# Patient Record
Sex: FEMALE
[De-identification: names, ages, dates, MRNs, and addresses within clinical notes are randomized per-mention and may not be internally consistent; named-entity substitution may affect disease eponyms.]

---

## 2020-08-01 ENCOUNTER — NURSE TRIAGE (OUTPATIENT)
Dept: OTHER | Facility: CLINIC | Age: 35
End: 2020-08-01

## 2020-08-01 NOTE — TELEPHONE ENCOUNTER
Reason for Disposition   MODERATE-SEVERE abdominal pain (e.g., interferes with normal activities, awakens from sleep)    Answer Assessment - Initial Assessment Questions  1. LOCATION: \"Where does it hurt? \"       Pressure in lower abdomen / pelvis. 2. RADIATION: \"Does the pain shoot anywhere else? \" (e.g., chest, back)     Denies. 3. ONSET: \"When did the pain begin? \" (Minutes, hours or days ago)     1 day ago. 4. ONSET: \"Gradual or sudden onset? \"      Gradual.   5. PATTERN: \"Does the pain come and go, or has it been constant since it started? \"       Comes and goes. 6. SEVERITY: \"How bad is the pain? \" \"What does it keep you from doing? \"  (e.g., Scale 1-10; mild, moderate, or severe)    - MILD (1-3): doesn't interfere with normal activities, abdomen soft and not tender to touch     - MODERATE (4-7): interferes with normal activities or awakens from sleep, tender to touch     - SEVERE (8-10): excruciating pain, doubled over, unable to do any normal activities      Describes as a \"pressure\". 7/10. Interferes with activities and sleep. 7. RECURRENT SYMPTOM: \"Have you ever had this type of abdominal pain before? \" If so, ask: \"When was the last time? \" and \"What happened that time? \"       Denies. 8. CAUSE: \"What do you think is causing the abdominal pain? Unsure. 9. RELIEVING/AGGRAVATING FACTORS: \"What makes it better or worse? \" (e.g., antacids, bowel movement, movement)      Sitting up makes it a little better. 10. OTHER SYMPTOMS: \"Has there been any vaginal bleeding, fever, vomiting, diarrhea, or urine problems? \"      Hands are swollen,  had a pain in middle of chest last night for a short time. 11. ABBEY: \"What date are you expecting to deliver? \"      09/26/2020.     Protocols used: PREGNANCY - ABDOMINAL PAIN GREATER THAN 20 WEEKS EGA-ADULT-

## 2023-12-07 ENCOUNTER — OFFICE VISIT (OUTPATIENT)
Dept: UROLOGY | Facility: CLINIC | Age: 38
End: 2023-12-07
Payer: COMMERCIAL

## 2023-12-07 ENCOUNTER — PREP FOR PROCEDURE (OUTPATIENT)
Dept: UROLOGY | Facility: CLINIC | Age: 38
End: 2023-12-07

## 2023-12-07 VITALS
BODY MASS INDEX: 21.95 KG/M2 | SYSTOLIC BLOOD PRESSURE: 126 MMHG | HEART RATE: 87 BPM | TEMPERATURE: 97.7 F | WEIGHT: 136 LBS | DIASTOLIC BLOOD PRESSURE: 77 MMHG

## 2023-12-07 DIAGNOSIS — N20.0 KIDNEY STONE: Primary | ICD-10-CM

## 2023-12-07 DIAGNOSIS — N20.0 NEPHROLITHIASIS: ICD-10-CM

## 2023-12-07 LAB
POC APPEARANCE, URINE: CLEAR
POC BILIRUBIN, URINE: NEGATIVE
POC BLOOD, URINE: ABNORMAL
POC COLOR, URINE: YELLOW
POC GLUCOSE, URINE: NEGATIVE MG/DL
POC KETONES, URINE: NEGATIVE MG/DL
POC LEUKOCYTES, URINE: NEGATIVE
POC NITRITE,URINE: NEGATIVE
POC PH, URINE: 7 PH
POC PROTEIN, URINE: NEGATIVE MG/DL
POC SPECIFIC GRAVITY, URINE: 1.02
POC UROBILINOGEN, URINE: 0.2 EU/DL

## 2023-12-07 PROCEDURE — 99204 OFFICE O/P NEW MOD 45 MIN: CPT | Performed by: UROLOGY

## 2023-12-07 PROCEDURE — 87086 URINE CULTURE/COLONY COUNT: CPT

## 2023-12-07 PROCEDURE — 1036F TOBACCO NON-USER: CPT | Performed by: UROLOGY

## 2023-12-07 PROCEDURE — 81003 URINALYSIS AUTO W/O SCOPE: CPT | Performed by: UROLOGY

## 2023-12-07 RX ORDER — CEPHALEXIN 500 MG/1
500 CAPSULE ORAL 3 TIMES DAILY
COMMUNITY
Start: 2023-12-05 | End: 2023-12-12

## 2023-12-07 RX ORDER — NAPROXEN 500 MG/1
1 TABLET ORAL 2 TIMES DAILY PRN
COMMUNITY
Start: 2023-12-05

## 2023-12-07 NOTE — H&P (VIEW-ONLY)
PCP  Lotus Alvarenga MD         CHIEF COMPLAINT:  kidney stones         HISTORY OF PRESENT ILLNESS:  This is a 37 y.o. female,  who presents with kidney stones. Back pain started 10 days ago, she went to the ED on  and was noted to have a 1 cm calculus at the left UVJ. Started on cephalexin on 3 days ago. Does reports nausea and persistent pain in the L lower back and suprapubic pain.        Gyn History:  -  -  x2  - OCPs    OB History    No obstetric history on file.          No past medical history on file.    No past surgical history on file.    No family history on file.    Review of Systems   All other systems reviewed and are negative.            PHYSICAL EXAMINATION:  No LMP recorded.  Body mass index is 21.95 kg/m².  Visit Vitals  /77   Pulse 87   Temp 36.5 °C (97.7 °F)   Wt 61.7 kg (136 lb)   BMI 21.95 kg/m²   Smoking Status Never   BSA 1.69 m²     General Appearance: well appearing  Neuro: Alert and oriented   HEENT: normocephalic, atraumatic  Heart: warm and well perfused  Lungs: breathing comfortably on room air  Abdomen: non-distended  : left CVA tenderness  Extremities: moving all extremities  Neuro: awake and conversant  Psych: appropriate mood and affect    Urine dip:   Recent Results (from the past 6 hour(s))   POCT UA Automated manually resulted    Collection Time: 23  3:59 PM   Result Value Ref Range    POC Color, Urine Yellow Straw, Yellow, Light-Yellow    POC Appearance, Urine Clear Clear    POC Glucose, Urine NEGATIVE NEGATIVE mg/dl    POC Bilirubin, Urine NEGATIVE NEGATIVE    POC Ketones, Urine NEGATIVE NEGATIVE mg/dl    POC Specific Gravity, Urine 1.025 1.005 - 1.035    POC Blood, Urine SMALL (1+) (A) NEGATIVE    POC PH, Urine 7.0 No Reference Range Established PH    POC Protein, Urine NEGATIVE NEGATIVE, 30 (1+) mg/dl    POC Urobilinogen, Urine 0.2 0.2, 1.0 EU/DL    Poc Nitrite, Urine NEGATIVE NEGATIVE    POC Leukocytes, Urine NEGATIVE NEGATIVE          IMPRESSION AND PLAN:  Rika Steve is a 37 y.o.  who presents with 1 cm calculus in the L UVJ    Left nephrolithiasis  - 1 cm calculus noted in the left UVJ  - Discussed shock wave lithotripsy, offered scheduling for procedure tomorrow with Dr. Jenkins or to schedule procedure on Monday. Patient would like to proceed with procedure on Monday  - Risks of procedure discussed  - will schedule for L ureteroscopy, Left shock wave lithotripsy, and double J stent placement on     Seen and discussed with Dr. Evin Mace MD PGY-2  Obstetrics and Gynecology    Problem List Items Addressed This Visit    None  Visit Diagnoses       Kidney stone    -  Primary    Relevant Orders    POCT UA Automated manually resulted (Completed)    Post-Void Residual (Completed)           All questions and concerns were answered and addressed.  The patient expressed understanding and agrees with the plan.

## 2023-12-10 LAB — BACTERIA UR CULT: NO GROWTH

## 2023-12-10 NOTE — PROGRESS NOTES
I saw and evaluated the patient. I personally obtained the key and critical portions of the history and physical exam or was physically present for key and critical portions performed by the resident/fellow. I reviewed the resident/fellow's documentation and discussed the patient with the resident/fellow. I agree with the resident/fellow's medical decision making as documented in the note.   Niranjan Cleary MD

## 2023-12-11 ENCOUNTER — HOSPITAL ENCOUNTER (OUTPATIENT)
Facility: HOSPITAL | Age: 38
Setting detail: OUTPATIENT SURGERY
Discharge: HOME | End: 2023-12-11
Attending: UROLOGY | Admitting: UROLOGY
Payer: COMMERCIAL

## 2023-12-11 ENCOUNTER — APPOINTMENT (OUTPATIENT)
Dept: RADIOLOGY | Facility: HOSPITAL | Age: 38
End: 2023-12-11
Payer: COMMERCIAL

## 2023-12-11 ENCOUNTER — ANESTHESIA (OUTPATIENT)
Dept: OPERATING ROOM | Facility: HOSPITAL | Age: 38
End: 2023-12-11
Payer: COMMERCIAL

## 2023-12-11 ENCOUNTER — ANESTHESIA EVENT (OUTPATIENT)
Dept: OPERATING ROOM | Facility: HOSPITAL | Age: 38
End: 2023-12-11
Payer: COMMERCIAL

## 2023-12-11 VITALS
WEIGHT: 130 LBS | TEMPERATURE: 96.8 F | SYSTOLIC BLOOD PRESSURE: 110 MMHG | BODY MASS INDEX: 22.2 KG/M2 | OXYGEN SATURATION: 98 % | HEART RATE: 66 BPM | HEIGHT: 64 IN | DIASTOLIC BLOOD PRESSURE: 64 MMHG | RESPIRATION RATE: 16 BRPM

## 2023-12-11 DIAGNOSIS — G89.18 ACUTE POST-OPERATIVE PAIN: ICD-10-CM

## 2023-12-11 DIAGNOSIS — N20.0 NEPHROLITHIASIS: Primary | ICD-10-CM

## 2023-12-11 PROBLEM — N20.1 LEFT URETERAL STONE: Status: ACTIVE | Noted: 2023-12-11

## 2023-12-11 LAB — HCG UR QL IA.RAPID: NEGATIVE

## 2023-12-11 PROCEDURE — 2780000003 HC OR 278 NO HCPCS: Performed by: UROLOGY

## 2023-12-11 PROCEDURE — 2500000004 HC RX 250 GENERAL PHARMACY W/ HCPCS (ALT 636 FOR OP/ED): Performed by: NURSE ANESTHETIST, CERTIFIED REGISTERED

## 2023-12-11 PROCEDURE — 52300 CYSTOSCOPY AND TREATMENT: CPT | Performed by: UROLOGY

## 2023-12-11 PROCEDURE — C1769 GUIDE WIRE: HCPCS | Performed by: UROLOGY

## 2023-12-11 PROCEDURE — 2500000004 HC RX 250 GENERAL PHARMACY W/ HCPCS (ALT 636 FOR OP/ED)

## 2023-12-11 PROCEDURE — 76000 FLUOROSCOPY <1 HR PHYS/QHP: CPT | Mod: 59

## 2023-12-11 PROCEDURE — A52356 PR CYSTO/URETERO W/LITHOTRIPSY  AND INDWELL STENT INSRT: Performed by: NURSE ANESTHETIST, CERTIFIED REGISTERED

## 2023-12-11 PROCEDURE — 7100000001 HC RECOVERY ROOM TIME - INITIAL BASE CHARGE: Performed by: UROLOGY

## 2023-12-11 PROCEDURE — 52356 CYSTO/URETERO W/LITHOTRIPSY: CPT | Performed by: UROLOGY

## 2023-12-11 PROCEDURE — 7100000002 HC RECOVERY ROOM TIME - EACH INCREMENTAL 1 MINUTE: Performed by: UROLOGY

## 2023-12-11 PROCEDURE — 7100000009 HC PHASE TWO TIME - INITIAL BASE CHARGE: Performed by: UROLOGY

## 2023-12-11 PROCEDURE — A52356 PR CYSTO/URETERO W/LITHOTRIPSY  AND INDWELL STENT INSRT: Performed by: ANESTHESIOLOGY

## 2023-12-11 PROCEDURE — C1758 CATHETER, URETERAL: HCPCS | Performed by: UROLOGY

## 2023-12-11 PROCEDURE — 3600000003 HC OR TIME - INITIAL BASE CHARGE - PROCEDURE LEVEL THREE: Performed by: UROLOGY

## 2023-12-11 PROCEDURE — 3700000001 HC GENERAL ANESTHESIA TIME - INITIAL BASE CHARGE: Performed by: UROLOGY

## 2023-12-11 PROCEDURE — C2617 STENT, NON-COR, TEM W/O DEL: HCPCS | Performed by: UROLOGY

## 2023-12-11 PROCEDURE — 2500000004 HC RX 250 GENERAL PHARMACY W/ HCPCS (ALT 636 FOR OP/ED): Performed by: ANESTHESIOLOGY

## 2023-12-11 PROCEDURE — 2720000007 HC OR 272 NO HCPCS: Performed by: UROLOGY

## 2023-12-11 PROCEDURE — 3600000008 HC OR TIME - EACH INCREMENTAL 1 MINUTE - PROCEDURE LEVEL THREE: Performed by: UROLOGY

## 2023-12-11 PROCEDURE — 2500000001 HC RX 250 WO HCPCS SELF ADMINISTERED DRUGS (ALT 637 FOR MEDICARE OP): Performed by: ANESTHESIOLOGY

## 2023-12-11 PROCEDURE — 3700000002 HC GENERAL ANESTHESIA TIME - EACH INCREMENTAL 1 MINUTE: Performed by: UROLOGY

## 2023-12-11 PROCEDURE — 7100000010 HC PHASE TWO TIME - EACH INCREMENTAL 1 MINUTE: Performed by: UROLOGY

## 2023-12-11 PROCEDURE — 2500000005 HC RX 250 GENERAL PHARMACY W/O HCPCS: Performed by: NURSE ANESTHETIST, CERTIFIED REGISTERED

## 2023-12-11 PROCEDURE — 82365 CALCULUS SPECTROSCOPY: CPT | Performed by: UROLOGY

## 2023-12-11 PROCEDURE — 81025 URINE PREGNANCY TEST: CPT | Performed by: UROLOGY

## 2023-12-11 DEVICE — URETERAL STENT
Type: IMPLANTABLE DEVICE | Site: KIDNEY | Status: FUNCTIONAL
Brand: PERCUFLEX™ PLUS

## 2023-12-11 RX ORDER — OXYCODONE HYDROCHLORIDE 5 MG/1
5 TABLET ORAL EVERY 6 HOURS PRN
Qty: 5 TABLET | Refills: 0 | Status: SHIPPED | OUTPATIENT
Start: 2023-12-11

## 2023-12-11 RX ORDER — ONDANSETRON 4 MG/1
4 TABLET, ORALLY DISINTEGRATING ORAL EVERY 8 HOURS PRN
Status: CANCELLED | OUTPATIENT
Start: 2023-12-11

## 2023-12-11 RX ORDER — HYDROMORPHONE HYDROCHLORIDE 1 MG/ML
1 INJECTION, SOLUTION INTRAMUSCULAR; INTRAVENOUS; SUBCUTANEOUS EVERY 5 MIN PRN
Status: DISCONTINUED | OUTPATIENT
Start: 2023-12-11 | End: 2023-12-12 | Stop reason: HOSPADM

## 2023-12-11 RX ORDER — SODIUM CHLORIDE, SODIUM LACTATE, POTASSIUM CHLORIDE, CALCIUM CHLORIDE 600; 310; 30; 20 MG/100ML; MG/100ML; MG/100ML; MG/100ML
100 INJECTION, SOLUTION INTRAVENOUS CONTINUOUS
Status: DISCONTINUED | OUTPATIENT
Start: 2023-12-11 | End: 2023-12-12 | Stop reason: HOSPADM

## 2023-12-11 RX ORDER — MIDAZOLAM HYDROCHLORIDE 1 MG/ML
INJECTION, SOLUTION INTRAMUSCULAR; INTRAVENOUS AS NEEDED
Status: DISCONTINUED | OUTPATIENT
Start: 2023-12-11 | End: 2023-12-11

## 2023-12-11 RX ORDER — PROCHLORPERAZINE 25 MG/1
25 SUPPOSITORY RECTAL EVERY 12 HOURS PRN
Status: CANCELLED | OUTPATIENT
Start: 2023-12-11

## 2023-12-11 RX ORDER — POLYETHYLENE GLYCOL 3350 17 G/17G
17 POWDER, FOR SOLUTION ORAL DAILY
Qty: 10 PACKET | Refills: 0 | Status: SHIPPED | OUTPATIENT
Start: 2023-12-11 | End: 2023-12-21

## 2023-12-11 RX ORDER — OXYCODONE HYDROCHLORIDE 10 MG/1
10 TABLET ORAL EVERY 4 HOURS PRN
Status: DISCONTINUED | OUTPATIENT
Start: 2023-12-11 | End: 2023-12-12 | Stop reason: HOSPADM

## 2023-12-11 RX ORDER — ONDANSETRON HYDROCHLORIDE 2 MG/ML
4 INJECTION, SOLUTION INTRAVENOUS EVERY 8 HOURS PRN
Status: CANCELLED | OUTPATIENT
Start: 2023-12-11

## 2023-12-11 RX ORDER — MIDAZOLAM HYDROCHLORIDE 1 MG/ML
1 INJECTION, SOLUTION INTRAMUSCULAR; INTRAVENOUS ONCE AS NEEDED
Status: DISCONTINUED | OUTPATIENT
Start: 2023-12-11 | End: 2023-12-12 | Stop reason: HOSPADM

## 2023-12-11 RX ORDER — SCOLOPAMINE TRANSDERMAL SYSTEM 1 MG/1
1 PATCH, EXTENDED RELEASE TRANSDERMAL ONCE
Status: DISCONTINUED | OUTPATIENT
Start: 2023-12-11 | End: 2023-12-12 | Stop reason: HOSPADM

## 2023-12-11 RX ORDER — ONDANSETRON HYDROCHLORIDE 2 MG/ML
4 INJECTION, SOLUTION INTRAVENOUS ONCE AS NEEDED
Status: COMPLETED | OUTPATIENT
Start: 2023-12-11 | End: 2023-12-11

## 2023-12-11 RX ORDER — ONDANSETRON HYDROCHLORIDE 2 MG/ML
INJECTION, SOLUTION INTRAVENOUS AS NEEDED
Status: DISCONTINUED | OUTPATIENT
Start: 2023-12-11 | End: 2023-12-11

## 2023-12-11 RX ORDER — SENNOSIDES 8.6 MG/1
2 TABLET ORAL 2 TIMES DAILY
Status: CANCELLED | OUTPATIENT
Start: 2023-12-11

## 2023-12-11 RX ORDER — SODIUM CHLORIDE, SODIUM LACTATE, POTASSIUM CHLORIDE, CALCIUM CHLORIDE 600; 310; 30; 20 MG/100ML; MG/100ML; MG/100ML; MG/100ML
50 INJECTION, SOLUTION INTRAVENOUS CONTINUOUS
Status: DISCONTINUED | OUTPATIENT
Start: 2023-12-11 | End: 2023-12-12 | Stop reason: HOSPADM

## 2023-12-11 RX ORDER — PROPOFOL 10 MG/ML
INJECTION, EMULSION INTRAVENOUS AS NEEDED
Status: DISCONTINUED | OUTPATIENT
Start: 2023-12-11 | End: 2023-12-11

## 2023-12-11 RX ORDER — SODIUM CHLORIDE, SODIUM LACTATE, POTASSIUM CHLORIDE, CALCIUM CHLORIDE 600; 310; 30; 20 MG/100ML; MG/100ML; MG/100ML; MG/100ML
100 INJECTION, SOLUTION INTRAVENOUS CONTINUOUS
Status: DISCONTINUED | OUTPATIENT
Start: 2023-12-11 | End: 2023-12-11

## 2023-12-11 RX ORDER — PROCHLORPERAZINE MALEATE 10 MG
10 TABLET ORAL EVERY 6 HOURS PRN
Status: CANCELLED | OUTPATIENT
Start: 2023-12-11

## 2023-12-11 RX ORDER — IBUPROFEN 600 MG/1
600 TABLET ORAL EVERY 8 HOURS PRN
Status: CANCELLED | OUTPATIENT
Start: 2023-12-11

## 2023-12-11 RX ORDER — ACETAMINOPHEN 325 MG/1
650 TABLET ORAL EVERY 6 HOURS PRN
Qty: 14 TABLET | Refills: 0 | Status: SHIPPED | OUTPATIENT
Start: 2023-12-11 | End: 2023-12-25

## 2023-12-11 RX ORDER — ACETAMINOPHEN 325 MG/1
650 TABLET ORAL EVERY 6 HOURS
Status: CANCELLED | OUTPATIENT
Start: 2023-12-11

## 2023-12-11 RX ORDER — TAMSULOSIN HYDROCHLORIDE 0.4 MG/1
0.4 CAPSULE ORAL DAILY
Status: CANCELLED | OUTPATIENT
Start: 2023-12-12

## 2023-12-11 RX ORDER — PHENAZOPYRIDINE HYDROCHLORIDE 200 MG/1
200 TABLET, FILM COATED ORAL 3 TIMES DAILY PRN
Qty: 10 TABLET | Refills: 0 | Status: SHIPPED | OUTPATIENT
Start: 2023-12-11 | End: 2023-12-14

## 2023-12-11 RX ORDER — HYDROMORPHONE HYDROCHLORIDE 1 MG/ML
0.1 INJECTION, SOLUTION INTRAMUSCULAR; INTRAVENOUS; SUBCUTANEOUS EVERY 5 MIN PRN
Status: DISCONTINUED | OUTPATIENT
Start: 2023-12-11 | End: 2023-12-12 | Stop reason: HOSPADM

## 2023-12-11 RX ORDER — ALBUTEROL SULFATE 0.83 MG/ML
2.5 SOLUTION RESPIRATORY (INHALATION) ONCE AS NEEDED
Status: DISCONTINUED | OUTPATIENT
Start: 2023-12-11 | End: 2023-12-12 | Stop reason: HOSPADM

## 2023-12-11 RX ORDER — HYDRALAZINE HYDROCHLORIDE 20 MG/ML
5 INJECTION INTRAMUSCULAR; INTRAVENOUS EVERY 30 MIN PRN
Status: DISCONTINUED | OUTPATIENT
Start: 2023-12-11 | End: 2023-12-12 | Stop reason: HOSPADM

## 2023-12-11 RX ORDER — OXYCODONE HYDROCHLORIDE 5 MG/1
5 TABLET ORAL EVERY 6 HOURS PRN
Status: CANCELLED | OUTPATIENT
Start: 2023-12-11

## 2023-12-11 RX ORDER — PHENAZOPYRIDINE HYDROCHLORIDE 100 MG/1
200 TABLET, FILM COATED ORAL ONCE AS NEEDED
Status: DISCONTINUED | OUTPATIENT
Start: 2023-12-11 | End: 2023-12-12 | Stop reason: HOSPADM

## 2023-12-11 RX ORDER — LIDOCAINE HYDROCHLORIDE 20 MG/ML
INJECTION, SOLUTION EPIDURAL; INFILTRATION; INTRACAUDAL; PERINEURAL AS NEEDED
Status: DISCONTINUED | OUTPATIENT
Start: 2023-12-11 | End: 2023-12-11

## 2023-12-11 RX ORDER — ACETAMINOPHEN 325 MG/1
650 TABLET ORAL EVERY 4 HOURS PRN
Status: DISCONTINUED | OUTPATIENT
Start: 2023-12-11 | End: 2023-12-12 | Stop reason: HOSPADM

## 2023-12-11 RX ORDER — HYDROCODONE BITARTRATE AND ACETAMINOPHEN 5; 325 MG/1; MG/1
1 TABLET ORAL EVERY 4 HOURS PRN
Status: DISCONTINUED | OUTPATIENT
Start: 2023-12-11 | End: 2023-12-12 | Stop reason: HOSPADM

## 2023-12-11 RX ORDER — DEXAMETHASONE SODIUM PHOSPHATE 100 MG/10ML
INJECTION INTRAMUSCULAR; INTRAVENOUS AS NEEDED
Status: DISCONTINUED | OUTPATIENT
Start: 2023-12-11 | End: 2023-12-11

## 2023-12-11 RX ORDER — POLYETHYLENE GLYCOL 3350 17 G/17G
17 POWDER, FOR SOLUTION ORAL DAILY
Status: CANCELLED | OUTPATIENT
Start: 2023-12-12

## 2023-12-11 RX ORDER — HYDROMORPHONE HYDROCHLORIDE 1 MG/ML
0.5 INJECTION, SOLUTION INTRAMUSCULAR; INTRAVENOUS; SUBCUTANEOUS EVERY 5 MIN PRN
Status: DISCONTINUED | OUTPATIENT
Start: 2023-12-11 | End: 2023-12-12 | Stop reason: HOSPADM

## 2023-12-11 RX ORDER — FENTANYL CITRATE 50 UG/ML
INJECTION, SOLUTION INTRAMUSCULAR; INTRAVENOUS AS NEEDED
Status: DISCONTINUED | OUTPATIENT
Start: 2023-12-11 | End: 2023-12-11

## 2023-12-11 RX ORDER — PROCHLORPERAZINE EDISYLATE 5 MG/ML
10 INJECTION INTRAMUSCULAR; INTRAVENOUS EVERY 6 HOURS PRN
Status: CANCELLED | OUTPATIENT
Start: 2023-12-11

## 2023-12-11 RX ORDER — CEFAZOLIN SODIUM 2 G/100ML
2 INJECTION, SOLUTION INTRAVENOUS ONCE
Status: COMPLETED | OUTPATIENT
Start: 2023-12-11 | End: 2023-12-11

## 2023-12-11 RX ORDER — KETOROLAC TROMETHAMINE 30 MG/ML
30 INJECTION, SOLUTION INTRAMUSCULAR; INTRAVENOUS ONCE
Status: COMPLETED | OUTPATIENT
Start: 2023-12-11 | End: 2023-12-11

## 2023-12-11 RX ORDER — PHENAZOPYRIDINE HYDROCHLORIDE 100 MG/1
200 TABLET, FILM COATED ORAL 3 TIMES DAILY PRN
Status: CANCELLED | OUTPATIENT
Start: 2023-12-11

## 2023-12-11 RX ORDER — TAMSULOSIN HYDROCHLORIDE 0.4 MG/1
0.4 CAPSULE ORAL
Qty: 14 CAPSULE | Refills: 0 | Status: SHIPPED | OUTPATIENT
Start: 2023-12-11 | End: 2023-12-25

## 2023-12-11 RX ADMIN — ONDANSETRON 4 MG: 2 INJECTION INTRAMUSCULAR; INTRAVENOUS at 17:14

## 2023-12-11 RX ADMIN — SCOPALAMINE 1 PATCH: 1 PATCH, EXTENDED RELEASE TRANSDERMAL at 21:01

## 2023-12-11 RX ADMIN — PROMETHAZINE HYDROCHLORIDE 6.25 MG: 25 INJECTION INTRAMUSCULAR; INTRAVENOUS at 22:11

## 2023-12-11 RX ADMIN — FENTANYL CITRATE 25 MCG: 50 INJECTION, SOLUTION INTRAMUSCULAR; INTRAVENOUS at 16:10

## 2023-12-11 RX ADMIN — LIDOCAINE HYDROCHLORIDE 60 MG: 20 INJECTION, SOLUTION EPIDURAL; INFILTRATION; INTRACAUDAL; PERINEURAL at 15:17

## 2023-12-11 RX ADMIN — FENTANYL CITRATE 25 MCG: 50 INJECTION, SOLUTION INTRAMUSCULAR; INTRAVENOUS at 15:44

## 2023-12-11 RX ADMIN — HYDROCODONE BITARTRATE AND ACETAMINOPHEN 1 TABLET: 5; 325 TABLET ORAL at 19:24

## 2023-12-11 RX ADMIN — HYDROMORPHONE HYDROCHLORIDE 0.5 MG: 1 INJECTION, SOLUTION INTRAMUSCULAR; INTRAVENOUS; SUBCUTANEOUS at 17:18

## 2023-12-11 RX ADMIN — SODIUM CHLORIDE, SODIUM LACTATE, POTASSIUM CHLORIDE, AND CALCIUM CHLORIDE: 600; 310; 30; 20 INJECTION, SOLUTION INTRAVENOUS at 15:08

## 2023-12-11 RX ADMIN — HYDROMORPHONE HYDROCHLORIDE 0.5 MG: 1 INJECTION, SOLUTION INTRAMUSCULAR; INTRAVENOUS; SUBCUTANEOUS at 17:10

## 2023-12-11 RX ADMIN — ACETAMINOPHEN 650 MG: 325 TABLET ORAL at 22:45

## 2023-12-11 RX ADMIN — KETOROLAC TROMETHAMINE 30 MG: 30 INJECTION, SOLUTION INTRAMUSCULAR at 18:43

## 2023-12-11 RX ADMIN — PHENAZOPYRIDINE HYDROCHLORIDE 200 MG: 100 TABLET ORAL at 18:30

## 2023-12-11 RX ADMIN — SODIUM CHLORIDE, POTASSIUM CHLORIDE, SODIUM LACTATE AND CALCIUM CHLORIDE 100 ML/HR: 600; 310; 30; 20 INJECTION, SOLUTION INTRAVENOUS at 14:07

## 2023-12-11 RX ADMIN — PROPOFOL 200 MG: 10 INJECTION, EMULSION INTRAVENOUS at 15:17

## 2023-12-11 RX ADMIN — SODIUM CHLORIDE, SODIUM LACTATE, POTASSIUM CHLORIDE, AND CALCIUM CHLORIDE: 600; 310; 30; 20 INJECTION, SOLUTION INTRAVENOUS at 16:36

## 2023-12-11 RX ADMIN — SODIUM CHLORIDE, POTASSIUM CHLORIDE, SODIUM LACTATE AND CALCIUM CHLORIDE 100 ML/HR: 600; 310; 30; 20 INJECTION, SOLUTION INTRAVENOUS at 19:03

## 2023-12-11 RX ADMIN — HYDROMORPHONE HYDROCHLORIDE 0.5 MG: 1 INJECTION, SOLUTION INTRAMUSCULAR; INTRAVENOUS; SUBCUTANEOUS at 17:50

## 2023-12-11 RX ADMIN — MIDAZOLAM 2 MG: 1 INJECTION INTRAMUSCULAR; INTRAVENOUS at 15:08

## 2023-12-11 RX ADMIN — DEXAMETHASONE SODIUM PHOSPHATE 4 MG: 10 INJECTION INTRAMUSCULAR; INTRAVENOUS at 15:17

## 2023-12-11 RX ADMIN — CEFAZOLIN SODIUM 1 G: 2 INJECTION, SOLUTION INTRAVENOUS at 15:23

## 2023-12-11 RX ADMIN — HYDROMORPHONE HYDROCHLORIDE 0.5 MG: 1 INJECTION, SOLUTION INTRAMUSCULAR; INTRAVENOUS; SUBCUTANEOUS at 18:05

## 2023-12-11 RX ADMIN — ONDANSETRON 4 MG: 2 INJECTION, SOLUTION INTRAMUSCULAR; INTRAVENOUS at 15:17

## 2023-12-11 RX ADMIN — FENTANYL CITRATE 50 MCG: 50 INJECTION, SOLUTION INTRAMUSCULAR; INTRAVENOUS at 15:17

## 2023-12-11 SDOH — HEALTH STABILITY: MENTAL HEALTH: CURRENT SMOKER: 0

## 2023-12-11 ASSESSMENT — PAIN SCALES - GENERAL
PAINLEVEL_OUTOF10: 8
PAINLEVEL_OUTOF10: 7
PAINLEVEL_OUTOF10: 6
PAINLEVEL_OUTOF10: 5 - MODERATE PAIN
PAINLEVEL_OUTOF10: 8
PAINLEVEL_OUTOF10: 6
PAINLEVEL_OUTOF10: 0 - NO PAIN
PAINLEVEL_OUTOF10: 0 - NO PAIN
PAINLEVEL_OUTOF10: 6
PAINLEVEL_OUTOF10: 7
PAINLEVEL_OUTOF10: 0 - NO PAIN
PAINLEVEL_OUTOF10: 7
PAINLEVEL_OUTOF10: 0 - NO PAIN
PAINLEVEL_OUTOF10: 6
PAINLEVEL_OUTOF10: 6
PAIN_LEVEL: 1
PAINLEVEL_OUTOF10: 6
PAINLEVEL_OUTOF10: 8

## 2023-12-11 ASSESSMENT — PAIN - FUNCTIONAL ASSESSMENT
PAIN_FUNCTIONAL_ASSESSMENT: 0-10

## 2023-12-11 ASSESSMENT — COLUMBIA-SUICIDE SEVERITY RATING SCALE - C-SSRS
6. HAVE YOU EVER DONE ANYTHING, STARTED TO DO ANYTHING, OR PREPARED TO DO ANYTHING TO END YOUR LIFE?: NO
2. HAVE YOU ACTUALLY HAD ANY THOUGHTS OF KILLING YOURSELF?: NO
1. IN THE PAST MONTH, HAVE YOU WISHED YOU WERE DEAD OR WISHED YOU COULD GO TO SLEEP AND NOT WAKE UP?: NO

## 2023-12-11 ASSESSMENT — PAIN DESCRIPTION - DESCRIPTORS
DESCRIPTORS: SHARP
DESCRIPTORS: SHARP

## 2023-12-11 NOTE — ANESTHESIA PROCEDURE NOTES
Airway  Date/Time: 12/11/2023 3:19 PM  Urgency: elective    Airway not difficult    Staffing  Performed: SHAYNE   Authorized by: Dick Arechiga MD    Performed by: SHAYNE Cox  Patient location during procedure: OR    Indications and Patient Condition  Indications for airway management: anesthesia  Spontaneous Ventilation: absent  Sedation level: deep  Preoxygenated: yes  Patient position: sniffing  MILS maintained throughout  Mask difficulty assessment: 1 - vent by mask  Planned trial extubation    Final Airway Details  Final airway type: supraglottic airway      Successful airway: classic  Size 3     Number of attempts at approach: 1  Ventilation between attempts: supraglottic airway

## 2023-12-11 NOTE — ANESTHESIA PREPROCEDURE EVALUATION
Patient: Rika Steve    Procedure Information       Date/Time: 12/11/23 1335    Procedure: Cystoscopy, retrograde pyelogram, Left Ureteroscopy, Laser lithotripsy,  left ureteral stent placement (Left)    Location: [facility] / [facility]    Surgeons: Niranjan Cleary MD            Relevant Problems   /Renal   (+) Nephrolithiasis       Clinical information reviewed:   Tobacco  Allergies  Meds   Med Hx  Surg Hx  OB Status  Fam Hx  Soc   Hx        NPO Detail:  NPO/Void Status  Carbonhydrate Drink Given Prior to Surgery? : N  Date of Last Liquid: 12/10/23  Time of Last Liquid: 2000  Date of Last Solid: 12/10/23  Time of Last Solid: 2000  Last Intake Type: Light meal  Time of Last Void: 1245         Physical Exam    Airway  Mallampati: II  TM distance: >3 FB  Neck ROM: full     Cardiovascular - normal exam  Rhythm: regular  Rate: normal     Dental - normal exam     Pulmonary - normal exam  Breath sounds clear to auscultation     Abdominal   Abdomen: soft  Bowel sounds: normal           Anesthesia Plan    ASA 2     general     The patient is not a current smoker.  Patient was previously instructed to abstain from smoking on day of procedure.  Patient did not smoke on day of procedure.  Education provided regarding risk of obstructive sleep apnea.  intravenous induction   Postoperative administration of opioids is intended.  Anesthetic plan and risks discussed with patient.  Use of blood products discussed with patient who.    Plan discussed with [facility].

## 2023-12-11 NOTE — DISCHARGE INSTRUCTIONS
DEPARTMENT OF Urology  DISCHARGE INSTRUCTIONS Ureteroscopy Laser Lithotripsy  Outpatient Surgery    C O N F I D E N T I A L   I N F O R M A T I O N    Rika Steve    Call [phone] during regular daytime business hours (8:00 am - 5:00 pm) and after 5:00 pm ask for the Urology resident with any questions or concerns.    If it is a life-threatening situation, proceed to the nearest emergency department.              Thank you for the opportunity to care for you today.  Your health and healing are very important to us.  We hope we made you feel as comfortable as possible and are committed to your recovery and continued well-being.      The following is a brief overview of your Ureteroscopy Laser Lithotripsy procedure today. Some of the information contained on this summary may be confidential.  This information should be kept in your records and should be shared with your regular doctor.    Physicians:   Dr. Cleary    Procedure performed: Removal of your kidney stone,    What to Expect During your Recovery and Home Care  Anesthesia Side Effects   You received anesthesia today.  You may feel sleepy, tired, or have a sore throat.   You may also feel drowsiness, dizziness, or inability to think clearly.  For your safety, do not drive, drink alcoholic beverages, take any unprescribed medication or make any important decisions for 24 hours.  A responsible adult should be with you for 24 hours.        Activity and Recovery    No heavy lifting today. Rest for the next 24 hours.    Pain Control  Unfortunately, you may experience pain after your procedure.  Frequency and urgency to urinate and mild discomfort are expected. Adequate pain management can include alternative measures to help ease your pain and that can include over the counter Tylenol/ibuprofen and a heating pad or Oxycodone which can be taken as prescribed as needed for breakthrough pain. Do not take more than 4,000mg of Tylenol in a 24-hour period.       You may have also been prescribed Pyridium (for burning sensation) and Ditropan(for bladder spasms) for pain control. Pyridium will turn your urine bright orange.    Nausea/Vomiting   Clear liquids are best tolerated at first. Start slow, advance your diet as tolerated to normal foods. Avoid spicy, greasy, heavy foods at first. Also, you may feel nauseous or like you need to vomit if you take any type of medication on an empty stomach.  Call your physician if you are unable to eat or drink and have persistent vomiting.    Signs of Bleeding   You could have some blood in your urine off and on over the next several weeks. Your urine will be light pink to yellow.    Treatment/wound care:   It is okay to shower 24 hours after time of surgery.    Signs of Infection  Signs of infection can include fever, chills, burning sensation with passing of urine, or severe abdominal pain.  If you see any of these occur, please contact your doctor's office at [phone].  Any fever higher than 100.4, especially if associated with an ill feeling, abdominal pain, chills, or nausea should be reported to your surgeon.      Assist in bowel movements/urination  Increase fiber in diet  Increase water (6 to 8 glasses)  Increase walking   Urination should occur within 6 hours of anesthesia  If you have tried these methods and your bladder still feels full and you cannot use the bathroom, please go to your nearest Emergency room/contact your physician.    Additional Instructions:   Pieces of your kidney stone may pass in the urine for a few days and may cause some mild pain. You also had a stent placed today from your kidney down into your bladder. This helps keep the urinary tract open and prevents blockages of urine flow. The stent can be irritating and can cause some frequency, urgency and blood in your urine when you go to the bathroom. It is important to drink plenty of water and take medications as prescribed. You may be sent home  with Pyridium which turns your urine bright orange. Applying a heating pad to your back will also help with this kind of pain.     Your stent was left on a string. You may remove the stent yourself by in 3 days by pulling on the string until the stent is completely out. You may also call the office to schedule stent removal in the office if you would like.

## 2023-12-13 ENCOUNTER — OFFICE VISIT (OUTPATIENT)
Dept: UROLOGY | Facility: CLINIC | Age: 38
End: 2023-12-13
Payer: COMMERCIAL

## 2023-12-13 VITALS — TEMPERATURE: 97.7 F | HEART RATE: 87 BPM | SYSTOLIC BLOOD PRESSURE: 114 MMHG | DIASTOLIC BLOOD PRESSURE: 72 MMHG

## 2023-12-13 DIAGNOSIS — R35.0 FREQUENCY OF URINATION: Primary | ICD-10-CM

## 2023-12-13 DIAGNOSIS — N13.2 HYDRONEPHROSIS WITH URINARY OBSTRUCTION DUE TO RENAL CALCULUS: ICD-10-CM

## 2023-12-13 LAB
POC APPEARANCE, URINE: CLEAR
POC BILIRUBIN, URINE: NEGATIVE
POC BLOOD, URINE: ABNORMAL
POC COLOR, URINE: ABNORMAL
POC GLUCOSE, URINE: ABNORMAL MG/DL
POC KETONES, URINE: ABNORMAL MG/DL
POC LEUKOCYTES, URINE: ABNORMAL
POC NITRITE,URINE: POSITIVE
POC PH, URINE: 5.5 PH
POC PROTEIN, URINE: ABNORMAL MG/DL
POC SPECIFIC GRAVITY, URINE: 1.02
POC UROBILINOGEN, URINE: 2 EU/DL

## 2023-12-13 PROCEDURE — 81003 URINALYSIS AUTO W/O SCOPE: CPT | Performed by: UROLOGY

## 2023-12-13 PROCEDURE — 99213 OFFICE O/P EST LOW 20 MIN: CPT | Performed by: UROLOGY

## 2023-12-13 PROCEDURE — 1036F TOBACCO NON-USER: CPT | Performed by: UROLOGY

## 2023-12-13 ASSESSMENT — ENCOUNTER SYMPTOMS
ALLERGIC/IMMUNOLOGIC NEGATIVE: 1
GASTROINTESTINAL NEGATIVE: 1
RESPIRATORY NEGATIVE: 1
MUSCULOSKELETAL NEGATIVE: 1
NEUROLOGICAL NEGATIVE: 1
CARDIOVASCULAR NEGATIVE: 1
PSYCHIATRIC NEGATIVE: 1
HEMATOLOGIC/LYMPHATIC NEGATIVE: 1
ENDOCRINE NEGATIVE: 1
EYES NEGATIVE: 1
CONSTITUTIONAL NEGATIVE: 1

## 2023-12-13 NOTE — PROGRESS NOTES
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 37 y.o. female presents today for removal of left ureteral stent. She has had some stent related discomfort, but she is doing well otherwise. Patient has no known allergies.. Patient is a non-smoker.     Plan:  UA showed red urine with +glucose, trace ketones, +++blood, ++protein, 2.0 EU/DL urobilinogen, positive nitrite, and +++leukocytes.     I personally reviewed a KUB from 2019 through the [facility] EMR with the patient today in clinic.      I personally reviewed the CT abdomen pelvis dated 12/4/23 with the patient today in clinic. FINDINGS:   Included images of the lower thorax: No focal lung consolidation or pleural effusion.   Hepatobiliary: Unremarkable liver without biliary dilation evident.   Pancreas: Unremarkable   Spleen: Unremarkable   Adrenal Glands: Unremarkable   Kidneys, ureters, and bladder: There is a 1 cm calculus at the left UVJ. No hydronephrosis. Additional suspected punctate nonobstructing left nephrolithiasis.   Abdominal and pelvic vasculature: Unremarkable   GI tract: No evidence of obstruction. The appendix is not visualized.   Peritoneum and  retroperitoneum: No free fluid or free air is noted.   Lymph Nodes: No abdominal or pelvic lymphadenopathy is evident.   Uterus and adnexa: Not well evaluated by CT.   Visualized musculoskeletal structures: No acute fracture or destructive osseous lesion is identified.   IMPRESSION:   1. 1 cm calculus at the left UVJ. No hydroureteronephrosis.       I personally reviewed the renal US dated 12/4/23 with the patient today in clinic. FINDINGS:   Right kidney   Size: 9.4 cm.   Renal cortex: Normal.   Hydronephrosis: None.   Calculi, cysts or masses: None.   Left kidney   Size: 11.0 cm.   Renal cortex: Normal.   Hydronephrosis: None.   Calculi, cysts or masses:   There is at least one nonobstructing stone measuring 5 mm.   A caliectasis versus parapelvic cyst in the inferior pole now measures 9 mm previously 1.3 cm on 2020 ultrasound scan.   Urinary bladder   There is a 1.3 x 0.7 cm stone in the left UVJ region (image 45 out of 102), possibly in the left ureterocele seen on 2020 ultrasound scan. Weak jet/ flow in the vicinity of the stone is demonstrated (image 56 out of 102). Normal right UVJ jet is seen.   Pre void bladder volume: 259 mL.   Post void bladder volume: 18 mL.   IMPRESSION:   * A 1.3 x 0.7 cm stone in the left UVJ region without upstream hydroureteronephrosis to suggest urinary tract obstruction. Weak jet/ flow in the vicinity of the stone is demonstrated. According to the sonographer, the patient had severe left-sided pain during the scan.   *  Left nephrolithiasis.   *  Sonographically unremarkable right kidney.       I do not yet have access to her pathology report.     I would like her to continue on antibiotics, drink plenty of fluids (2L of water/day), and obtain an US of kidneys in 6 months. I asked her to follow up with myself to review the results of renal US or sooner as needed.     Chief Complaint  History Of Present Illness   Testing Results:  UA showed red urine with +glucose, trace ketones,  +++blood, ++protein, 2.0 EU/DL urobilinogen, positive nitrite, and +++leukocytes. (Menses)  Left nephrolithiasis s/p cystoscopy, retrograde pyelogram, Left Ureteroscopy, Laser lithotripsy, unroofing of left ureteroecele of upper pole moeity and left ureteral stent placement 12/11/23 with myself    Today's Visit:  Last seen by myself 12/11/23 for cystoscopy, retrograde pyelogram, Left Ureteroscopy, Laser lithotripsy, left ureteral stent placement. 37 y.o. female presents today for removal of left ureteral stent. She has had some stent related discomfort, but she is doing well otherwise. Patient has no known allergies.. Patient is a non-smoker.      Review of Systems  Review of Systems   Constitutional: Negative.    HENT: Negative.     Eyes: Negative.    Respiratory: Negative.     Cardiovascular: Negative.    Gastrointestinal: Negative.    Endocrine: Negative.    Genitourinary:         REFER TO HPI   Musculoskeletal: Negative.    Skin: Negative.    Allergic/Immunologic: Negative.    Neurological: Negative.    Hematological: Negative.    Psychiatric/Behavioral: Negative.          Active Problems   Patient Active Problem List   Diagnosis    Nephrolithiasis    Left ureteral stone        Medical History  She has a past medical history of Asthma and Nephrolithiasis.    Surgical History  She has a past surgical history that includes Septoplasty; Rhinoplasty; and Knee surgery (Right).     Family History  Family History   Problem Relation Name Age of Onset    Arthritis Father Drake Miller     Diabetes Maternal Grandfather Sarai Abreu     Cancer Brother Kp Miller        Social History  She reports that she has never smoked. She has never used smokeless tobacco. She reports that she does not currently use alcohol. She reports that she does not currently use drugs.     Allergies  Patient has no known allergies.    Current Meds  Current Outpatient Medications:     acetaminophen (TylenoL) 325 mg tablet, Take 2 tablets  (650 mg) by mouth every 6 hours if needed for mild pain (1 - 3) for up to 14 days., Disp: 14 tablet, Rfl: 0    naproxen (Naprosyn) 500 mg tablet, Take 1 tablet (500 mg) by mouth 2 times a day as needed., Disp: , Rfl:     oxyCODONE (Roxicodone) 5 mg immediate release tablet, Take 1 tablet (5 mg) by mouth every 6 hours if needed for severe pain (7 - 10) for up to 5 doses., Disp: 5 tablet, Rfl: 0    phenazopyridine (Pyridium) 200 mg tablet, Take 1 tablet (200 mg) by mouth 3 times a day as needed (bladder irritation) for up to 3 days., Disp: 10 tablet, Rfl: 0    polyethylene glycol (Miralax) 17 gram packet, Take 17 g by mouth once daily for 10 days., Disp: 10 packet, Rfl: 0    tamsulosin (Flomax) 0.4 mg 24 hr capsule, Take 1 capsule (0.4 mg) by mouth once daily in the morning. Take before meals for 14 days., Disp: 14 capsule, Rfl: 0    Vitals  Visit Vitals  /72   Pulse 87   Temp 36.5 °C (97.7 °F)   LMP 11/11/2023   OB Status Having periods   Smoking Status Never        Physical Exam  NP: Constitutional: alert and in no acute distress, well developed, well nourished.   Head and Face: head and face: unremarkable.   Neck: no neck asymmetry, supple.   Pulmonary: no respiratory distress, unremarkable respiratory effort.   Skin: normal skin color and pigmentation, normal skin turgor, and no rash.   Neurologic: cranial nerves: non-focal, grossly intact.   Psychiatric: alert and oriented x 3.     Sexual Maturity: Normal.   External Genitalia: Unremarkable.   Rectum: Unremarkable.   Anus: Unremarkable.   Perianal area: Unremarkable.     Left ureteral stent removed without complication, patient tolerated well.     Results/Data  Recent Results (from the past 12 hour(s))   POCT UA Automated manually resulted    Collection Time: 12/13/23 12:08 PM   Result Value Ref Range    POC Color, Urine Red (A) Straw, Yellow, Light-Yellow    POC Appearance, Urine Clear Clear    POC Glucose, Urine 100 (1+) (A) NEGATIVE mg/dl    POC  Bilirubin, Urine NEGATIVE NEGATIVE    POC Ketones, Urine TRACE (A) NEGATIVE mg/dl    POC Specific Gravity, Urine 1.020 1.005 - 1.035    POC Blood, Urine LARGE (3+) (A) NEGATIVE    POC PH, Urine 5.5 No Reference Range Established PH    POC Protein, Urine 100 (2+) (A) NEGATIVE, 30 (1+) mg/dl    POC Urobilinogen, Urine 2.0 (A) 0.2, 1.0 EU/DL    Poc Nitrite, Urine POSITIVE (A) NEGATIVE    POC Leukocytes, Urine LARGE (3+) (A) NEGATIVE     Signatures  Scribe Attestation  By signing my name below, I, Jose A Damico   attest that this documentation has been prepared under the direction and in the presence of Niranjan Cleary MD.

## 2023-12-15 LAB
APPEARANCE STONE: NORMAL
COMPN STONE: NORMAL
SPECIMEN WT: 434 MG

## 2024-01-23 DIAGNOSIS — Z20.828 EXPOSURE TO INFLUENZA: Primary | ICD-10-CM

## 2024-01-23 RX ORDER — OSELTAMIVIR PHOSPHATE 75 MG/1
CAPSULE ORAL
Qty: 5 CAPSULE | Refills: 0 | Status: SHIPPED | OUTPATIENT
Start: 2024-01-23

## 2024-02-09 ENCOUNTER — HOSPITAL ENCOUNTER (OUTPATIENT)
Dept: RADIOLOGY | Facility: CLINIC | Age: 39
Discharge: HOME | End: 2024-02-09
Payer: COMMERCIAL

## 2024-02-09 DIAGNOSIS — N13.2 HYDRONEPHROSIS WITH URINARY OBSTRUCTION DUE TO RENAL CALCULUS: ICD-10-CM

## 2024-02-09 PROCEDURE — 76770 US EXAM ABDO BACK WALL COMP: CPT

## 2024-02-09 PROCEDURE — 76770 US EXAM ABDO BACK WALL COMP: CPT | Performed by: RADIOLOGY

## 2024-02-15 ENCOUNTER — OFFICE VISIT (OUTPATIENT)
Dept: UROLOGY | Facility: CLINIC | Age: 39
End: 2024-02-15
Payer: COMMERCIAL

## 2024-02-15 VITALS
BODY MASS INDEX: 22.31 KG/M2 | WEIGHT: 130 LBS | SYSTOLIC BLOOD PRESSURE: 111 MMHG | TEMPERATURE: 97.7 F | DIASTOLIC BLOOD PRESSURE: 77 MMHG | HEART RATE: 93 BPM

## 2024-02-15 DIAGNOSIS — N20.0 KIDNEY STONE: Primary | ICD-10-CM

## 2024-02-15 DIAGNOSIS — M54.50 LOW BACK PAIN, UNSPECIFIED BACK PAIN LATERALITY, UNSPECIFIED CHRONICITY, UNSPECIFIED WHETHER SCIATICA PRESENT: ICD-10-CM

## 2024-02-15 PROCEDURE — 87086 URINE CULTURE/COLONY COUNT: CPT

## 2024-02-15 PROCEDURE — 1036F TOBACCO NON-USER: CPT | Performed by: UROLOGY

## 2024-02-15 PROCEDURE — 99214 OFFICE O/P EST MOD 30 MIN: CPT | Performed by: UROLOGY

## 2024-02-15 ASSESSMENT — ENCOUNTER SYMPTOMS
CONSTITUTIONAL NEGATIVE: 1
GASTROINTESTINAL NEGATIVE: 1
ALLERGIC/IMMUNOLOGIC NEGATIVE: 1
MUSCULOSKELETAL NEGATIVE: 1
NEUROLOGICAL NEGATIVE: 1
RESPIRATORY NEGATIVE: 1
ENDOCRINE NEGATIVE: 1
EYES NEGATIVE: 1
CARDIOVASCULAR NEGATIVE: 1
HEMATOLOGIC/LYMPHATIC NEGATIVE: 1
PSYCHIATRIC NEGATIVE: 1

## 2024-02-16 LAB — BACTERIA UR CULT: NO GROWTH

## 2024-02-22 ENCOUNTER — EVALUATION (OUTPATIENT)
Dept: PHYSICAL THERAPY | Facility: CLINIC | Age: 39
End: 2024-02-22
Payer: COMMERCIAL

## 2024-02-22 DIAGNOSIS — M54.50 LOW BACK PAIN: Primary | ICD-10-CM

## 2024-02-22 PROCEDURE — 97014 ELECTRIC STIMULATION THERAPY: CPT | Mod: GP

## 2024-02-22 PROCEDURE — 97161 PT EVAL LOW COMPLEX 20 MIN: CPT | Mod: GP

## 2024-02-22 ASSESSMENT — COLUMBIA-SUICIDE SEVERITY RATING SCALE - C-SSRS
1. IN THE PAST MONTH, HAVE YOU WISHED YOU WERE DEAD OR WISHED YOU COULD GO TO SLEEP AND NOT WAKE UP?: NO
2. HAVE YOU ACTUALLY HAD ANY THOUGHTS OF KILLING YOURSELF?: NO
6. HAVE YOU EVER DONE ANYTHING, STARTED TO DO ANYTHING, OR PREPARED TO DO ANYTHING TO END YOUR LIFE?: NO

## 2024-02-22 ASSESSMENT — ENCOUNTER SYMPTOMS
DEPRESSION: 0
OCCASIONAL FEELINGS OF UNSTEADINESS: 0
LOSS OF SENSATION IN FEET: 0

## 2024-02-22 NOTE — PROGRESS NOTES
Patient Name Rika Steve  MRN: [id number]  Today's Date: 24  Time Calculation  Start Time: 0200  Stop Time: 245  Time Calculation (min): 45 min    Therapy Diagnoses:   1. Low back pain  Follow Up In Physical Therapy        General:  Reason for visit: LBP with right upper buttock pain  Referred by: Dr Evin Cowart MD appt:  24  Preferred Name:  Rika  Script:  PT evaluate and treat  Onset Date:  2/15/2024    Insurance:   Visit #: 1  Insurance Reviewed  (per information provided by  pre-cert team)   Authorization required:  N  Approved # of visits: 40  Authorization/MCR certification date range:      Assessment:    Pt has had right LS and buttock pain since last November when she was struggling with a kidney stone. She presents currently with preference to standing and walking with sitting and bending making her symptoms worse. Standing lumbar extension does not worsen, nor improve symptoms. Prone lumbar extension causes increased pain. 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 Pt will benefit from starting conservatively with prone on pillows and progressive lumbar extension.    Problems:    Pain:  _x__  Posture/Body mechanics deficits:  __x_  Decreased knowledge HEP:  __x_  Decreased knowledge of Precautions:  _x__  Activity Limitations:   _x__  ADL's/IADL's/Self-care skills:  _x__  ROM/Joint Mobility:  __x_  Strength:  __x_  Decreased functional level:   __x_  Flexibility:  _x__  Tenderness/decreased soft tissue mobility:  _x__  Gait/Stairs:  ___  Fall Risk:  ___  Balance:  ___  Edema:  ___  Participation restrictions:  ___  Sensory:  ___  Transfers:  ___  Decreased knowledge of brace:   ___  Other:  ___    X Indicates included in problem list    Goals:      ST weeks  Increased postural awareness    Compliant with HEP.     LTG:  by discharge  Increased postural awareness and posture WFL.     pain to  0-2 and patient I with self-management of symptoms.     Decreased pain and increased function with ADL's and IADL's.  Improve Oswestry to: 10% limitation of function.    Normal gait on level and uneven surfaces community level distances for improved function in the community.    Increase trunk AROM to WFL for improved function with taking care of her young children.      Increase trunk strength and stability to WFL for improved function with daily tasks of homemaking    I and compliant with HEP and proper neck and back care.       Rehab potential to achieve the above goals is good.    Patient is aware of diagnosis and prognosis and agrees with established plan of care and goals.    Plan:   Skilled PT 2 x/week for 4+6 weeks (Until goals achieved, maximum rehab potential has been achieved, or patient has plateaued)  for:    Aquatics  _____  CP   __x__  Dry needling  ____  Education  __x__  Electrical Stimulation  __x__  Gait training  ____  HEP  __x__  HP  ____  Manual  _x___  Mechanical Traction  ____  NMR  _x___  Self-care/home management  _x___  Therapeutic Exercise   _x___  Therapeutic Activities  __x__    ____  Work Conditioning  ____  Re-assessment  __x__  Other  ____    X Indicates included in treatment plan    Subjective:    HPI: Pt had kidney stone surgery last November and had back pain prior to surgery and has had pain ever since    Pain:    Pain 6-7/10     Type of pain:  sharp, constant  What increases pain: sitting and bending  What decreases pain:  standing and walking    Medical Hx/ Asthma/Kidney stones  Precautions: n/a     Adult Screening Risk:      Fall Risk:  no    Patient goal:  pain reduction  Patient is aware of diagnosis and prognosis.    Living Environment:    Social Support:  lives with: spouse and 2 young children    Prior Function:   Independent with all function prior to having kidney stones    Function:    A and O x 3  Sleep:  instructed in proper postures. Suggesting prone lying  intermittently with pillows  ADL's: limited with bending   Chores: limited by pain  Driving: independent  Work: endures pain with taking care of her children  Recreation: unable to work out  Sitting: Worsening symptoms  Standing:  symptoms improve   Walking:  symptoms improve      Objective:        Outcome Measures:  Other Measures  Oswestry Disablity Index (DAMARIS): 28     Posture:  WFL    Palpation: Tender over right PSIS    ROM:  Trunk:  Flexion:  3rd to floor touching  Extension: Approx 50% in standing,  in prone 75% with increased pain    MMT:  Abd: WFL  Bridge/LE extensors: WFL  B LE myotomes: WFL    Flexibility:  Hamstrings:  SLR:  R: (+) SLR  L: 90 deg  Hip flexors: WFL    Treatment:    Evaluation:  25 minutes  Modalities: 15 minutes  IFC x 15 min, 10 CV to LSA region with prone on pillow and DCP  Brief ex program given for positioning techniques to relieve pain  Attempted right flexion rotation mobs but patient had increased pain  Education:  poc, anatomy, physiology, posture, body mechanics, safety awareness, HEP.  Preferred learning:  pictures, demonstration.  Verbalizes good understanding.

## 2024-02-26 ENCOUNTER — TREATMENT (OUTPATIENT)
Dept: PHYSICAL THERAPY | Facility: CLINIC | Age: 39
End: 2024-02-26
Payer: COMMERCIAL

## 2024-02-26 DIAGNOSIS — M54.50 LOW BACK PAIN: Primary | ICD-10-CM

## 2024-02-26 PROCEDURE — 97161 PT EVAL LOW COMPLEX 20 MIN: CPT | Mod: GP

## 2024-02-26 PROCEDURE — 97110 THERAPEUTIC EXERCISES: CPT | Mod: GP

## 2024-02-26 PROCEDURE — 97014 ELECTRIC STIMULATION THERAPY: CPT | Mod: GP

## 2024-02-26 NOTE — PROGRESS NOTES
Physical Therapy Progress Notes    Patient Name Rika Steve  MRN: [id number]  Today's Date: 02/26/24  Time Calculation  Start Time: 1645  Stop Time: 1745  Time Calculation (min): 60 min  Therapuetic Exercise - 40 min  Manual Therapy-5 min  Electric stim with CP -15 min    Therapy Diagnoses:   1. Low back pain  Follow Up In Physical Therapy    Follow Up In Physical Therapy    Follow Up In Physical Therapy          General:  Reason for visit: LBP with right upper buttock pain  Referred by: Dr Evin Cowart MD appt:  6/20/24  Preferred Name:  Rika  Script:  PT evaluate and treat  Onset Date:  2/15/2024    Insurance:   Visit #: 2  Insurance Reviewed  (per information provided by  pre-cert team)   Authorization required:  N  Approved # of visits: 40  Authorization/MCR certification date range:      Assessment:    Pt has had right LS and buttock pain since last November when she was struggling with a kidney stone. She presents currently with preference to standing and walking with sitting and bending making her symptoms worse. Prone lumbar extension causes increased pain.  Patient able to tolerate prone on elbow partial movement for 5 reps.  Responded well to SI muscle energy.  Correction noted with muscle energy.  Educated patient to avoid bending and modify activities when tying her daughter's shoe or lifting her.  Pt will benefit from starting conservatively with prone on pillows and prone on elbow. Continue to reassess.    Problems:    Pain:  _x__  Posture/Body mechanics deficits:  __x_  Decreased knowledge HEP:  __x_  Decreased knowledge of Precautions:  _x__  Activity Limitations:   _x__  ADL's/IADL's/Self-care skills:  _x__  ROM/Joint Mobility:  __x_  Strength:  __x_  Decreased functional level:   __x_  Flexibility:  _x__  Tenderness/decreased soft tissue mobility:  _x__  Gait/Stairs:  ___  Fall Risk:  ___  Balance:  ___  Edema:  ___  Participation restrictions:  ___  Sensory:  ___  Transfers:   ___  Decreased knowledge of brace:   ___  Other:  ___    X Indicates included in problem list    Plan:     Continue to reassess and exercise modification as needed    Aquatics  _____  CP   __x__  Dry needling  ____  Education  __x__  Electrical Stimulation  __x__  Gait training  ____  HEP  __x__  HP  ____  Manual  _x___  Mechanical Traction  ____  NMR  _x___  Self-care/home management  _x___  Therapeutic Exercise   _x___  Therapeutic Activities  __x__    ____  Work Conditioning  ____  Re-assessment  __x__  Other  ____    X Indicates included in treatment plan    Subjective: Patient reports lying on stomach is helping, unable to get rid of the pain.    HPI: Pt had kidney stone surgery last November and had back pain prior to surgery and has had pain ever since    Pain:    Pain 6-7/10     Type of pain:  sharp, constant  What increases pain: sitting and bending  What decreases pain:  standing and walking    Medical Hx/ Asthma/Kidney stones  Precautions: n/a     Adult Screening Risk:      Fall Risk:  no    Prior Function:   Independent with all function prior to having kidney stones    Function:    A and O x 3  Sleep:  instructed in proper postures. Suggesting prone lying intermittently with pillows  ADL's: limited with bending   Chores: limited by pain  Driving: independent  Work: endures pain with taking care of her children  Recreation: unable to work out  Sitting: Worsening symptoms  Standing:  symptoms improve   Walking:  symptoms improve      Objective:        Outcome Measures:    DAMARIS =28    Posture:  WFL    Treatment:  Prone lying on the pillow- slight decrease in sympotms  Prone on elbow - with a pillow under the hips x 10 complained of soreness   Patient demonstrated Positive SI dysfunction Right ASIS lower than left, right LE longer than left  Isometric hip adduction with a ball x 10  Isometric hip abduction 5 sec hold with a belt x 10  Attempted isometric hip flexion produced symptoms- discontinued  Partial  hip abduction in side lying x 10 each side    Manual therapy:  PA in prone position 3-4 reps each grade 2.  X 5 min    Modalities: 15 minutes  IFC x 15 min, 10 CV to LSA region with prone on pillow and DCP    Education:  poc, anatomy, physiology, posture, body mechanics, safety awareness, HEP.  Preferred learning:  pictures, demonstration.  Verbalizes good understanding.

## 2024-02-26 NOTE — PROGRESS NOTES
Physical Therapy Progress Notes    Patient Name Rika Steve  MRN: [id number]  Today's Date: 02/26/24       Therapy Diagnoses:   No diagnosis found.    General:  Reason for visit: LBP with right upper buttock pain  Referred by: Dr Evin Cowart MD appt:  6/20/24  Preferred Name:  Rika  Script:  PT evaluate and treat  Onset Date:  2/15/2024    Insurance:   Visit #: 2  Insurance Reviewed  (per information provided by  pre-cert team)   Authorization required:  N  Approved # of visits: 40  Authorization/MCR certification date range:      Assessment:    Pt has had right LS and buttock pain since last November when she was struggling with a kidney stone. She presents currently with preference to standing and walking with sitting and bending making her symptoms worse. Standing lumbar extension does not worsen, nor improve symptoms. Prone lumbar extension causes increased pain. 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 Pt will benefit from starting conservatively with prone on pillows and progressive lumbar extension.    Problems:    Pain:  _x__  Posture/Body mechanics deficits:  __x_  Decreased knowledge HEP:  __x_  Decreased knowledge of Precautions:  _x__  Activity Limitations:   _x__  ADL's/IADL's/Self-care skills:  _x__  ROM/Joint Mobility:  __x_  Strength:  __x_  Decreased functional level:   __x_  Flexibility:  _x__  Tenderness/decreased soft tissue mobility:  _x__  Gait/Stairs:  ___  Fall Risk:  ___  Balance:  ___  Edema:  ___  Participation restrictions:  ___  Sensory:  ___  Transfers:  ___  Decreased knowledge of brace:   ___  Other:  ___    X Indicates included in problem list    Plan:   Skilled PT 2 x/week for 4+6 weeks (Until goals achieved, maximum rehab potential has been achieved, or patient has plateaued)  for:    Aquatics  _____  CP   __x__  Dry needling  ____  Education  __x__  Electrical Stimulation   __x__  Gait training  ____  HEP  __x__  HP  ____  Manual  _x___  Mechanical Traction  ____  NMR  _x___  Self-care/home management  _x___  Therapeutic Exercise   _x___  Therapeutic Activities  __x__    ____  Work Conditioning  ____  Re-assessment  __x__  Other  ____    X Indicates included in treatment plan    Subjective:    HPI: Pt had kidney stone surgery last November and had back pain prior to surgery and has had pain ever since    Pain:    Pain 6-7/10     Type of pain:  sharp, constant  What increases pain: sitting and bending  What decreases pain:  standing and walking    Medical Hx/ Asthma/Kidney stones  Precautions: n/a     Adult Screening Risk:      Fall Risk:  no    Prior Function:   Independent with all function prior to having kidney stones    Function:    A and O x 3  Sleep:  instructed in proper postures. Suggesting prone lying intermittently with pillows  ADL's: limited with bending   Chores: limited by pain  Driving: independent  Work: endures pain with taking care of her children  Recreation: unable to work out  Sitting: Worsening symptoms  Standing:  symptoms improve   Walking:  symptoms improve      Objective:        Outcome Measures:        Posture:  WFL    Treatment:    Modalities: 15 minutes  IFC x 15 min, 10 CV to LSA region with prone on pillow and DCP    Education:  poc, anatomy, physiology, posture, body mechanics, safety awareness, HEP.  Preferred learning:  pictures, demonstration.  Verbalizes good understanding.

## 2024-02-29 ENCOUNTER — APPOINTMENT (OUTPATIENT)
Dept: PHYSICAL THERAPY | Facility: CLINIC | Age: 39
End: 2024-02-29
Payer: COMMERCIAL

## 2024-03-04 ENCOUNTER — APPOINTMENT (OUTPATIENT)
Dept: PHYSICAL THERAPY | Facility: CLINIC | Age: 39
End: 2024-03-04
Payer: COMMERCIAL

## 2024-03-06 ENCOUNTER — TREATMENT (OUTPATIENT)
Dept: PHYSICAL THERAPY | Facility: CLINIC | Age: 39
End: 2024-03-06
Payer: COMMERCIAL

## 2024-03-06 DIAGNOSIS — M54.50 LOW BACK PAIN: ICD-10-CM

## 2024-03-06 PROCEDURE — 97110 THERAPEUTIC EXERCISES: CPT | Mod: GP,CQ

## 2024-03-06 PROCEDURE — 97014 ELECTRIC STIMULATION THERAPY: CPT | Mod: GP,CQ

## 2024-03-06 NOTE — PROGRESS NOTES
Physical Therapy Progress Notes    Patient Name iRka Steve  MRN: [id number]  Today's Date: 03/06/24  Time Calculation  Start Time: 0445  Stop Time: 0535  Time Calculation (min): 50 min  Therapuetic Exercise - 35 min    Electric stim with CP -15 min    Therapy Diagnoses:   1. Low back pain  Follow Up In Physical Therapy          General:  Reason for visit: LBP with right upper buttock pain  Referred by: Dr Evin Cowart MD appt:  6/20/24  Preferred Name:  Rika  Script:  PT evaluate and treat  Onset Date:  2/15/2024    Insurance:   Visit #: 3  Insurance Reviewed  (per information provided by  pre-cert team)   Authorization required:  N  Approved # of visits: 40  Authorization/MCR certification date range:      Assessment:        Pt. Ayde rx well. Able to abolish sx with prone and progress with extension. Once sx free ayde supine stabilization as per log.   Education : reported good understanding of all educated   Updated HEP.  Continue with skilled care to decrease sx and return to optimal functional level.    Plan:     Continue with extension to decrease sx and progress stabilization per pt. Tolerance     Subjective:   Feeling better since the last time she was  here. Feels the estim helped her.    HPI: Pt had kidney stone surgery last November and had back pain prior to surgery and has had pain ever since    Pain:    Pain 5/10 R LB /buttock      Type of pain:  sharp, constant  What increases pain: sitting and bending  What decreases pain:  standing and walking    Medical Hx/ Asthma/Kidney stones  Precautions: n/a     Adult Screening Risk:      Fall Risk:  no    Prior Function:   Independent with all function prior to having kidney stones    Function:    A and O x 3  Sleep:  instructed in proper postures. Suggesting prone lying intermittently with pillows  ADL's: limited with bending   Chores: limited by pain  Driving: independent  Work: endures pain with taking care of her children  Recreation:  unable to work out  Sitting: Worsening symptoms  Standing:  symptoms improve   Walking:  symptoms improve      Objective:        Outcome Measures:    DAMARIS =28    Posture:  WFL    Treatment:    Educated in spinal anatomy and use of the sx free positions     Prone lying pain free   Prone on elbow  30 sec pain free  Prone ly  30 sec pain free  Prone on elbow 30 sec pain free  Prone pain free    Ppu 2 x 10   no pain     Isometric hip adduction with a ball 2x 10  Isometric hip abduction 5 sec hold GTB 2 x 10  isometric hip flexion 5 sec 10x **  Small SLR 5 sec 10x  LLE   RLE 9x then pain returned  returned   Returned to prone to abolish pain    Manual therapy:  PA in prone position 3-4 reps each grade 2.  X 5 min  NP    Modalities: 15 minutes  IFC x 15 min, 10 CV to LB and CP int 8.8    Education:  poc, anatomy, physiology, posture, body mechanics, safety awareness, HEP.  Preferred learning:  pictures, demonstration.  Verbalizes good underAccess Code: [email or web address]  URL: [email or web address]  Date: 03/06/2024  Prepared by: Lia    Exercises  - Prone Press Up  - 1 x daily - 7 x weekly - 2 sets - 10 reps - 5 hold  - Hooklying Isometric Hip Flexion  - 1 x daily - 7 x weekly - 1 sets - 10 reps - 5 holdstanding.

## 2024-03-07 ENCOUNTER — TREATMENT (OUTPATIENT)
Dept: PHYSICAL THERAPY | Facility: CLINIC | Age: 39
End: 2024-03-07
Payer: COMMERCIAL

## 2024-03-07 DIAGNOSIS — M54.50 LOW BACK PAIN: ICD-10-CM

## 2024-03-07 PROCEDURE — 97014 ELECTRIC STIMULATION THERAPY: CPT | Mod: GP

## 2024-03-07 PROCEDURE — 97110 THERAPEUTIC EXERCISES: CPT | Mod: GP

## 2024-03-07 PROCEDURE — 97112 NEUROMUSCULAR REEDUCATION: CPT | Mod: GP

## 2024-03-07 NOTE — PROGRESS NOTES
Physical Therapy  Physical Therapy Progress Note    Patient Name Rika Steve   MRN: [id number]  Today's Date: 03/07/24  Time Calculation  Start Time: 1000  Stop Time: 1050  Time Calculation (min): 50 min    Insurance:    Visit #: 5 (  Insurance Reviewed  (per information provided by  pre-cert team)   Authorization required:  N  Approved # of visits: 40  Authorization/MCR certification date range:    Therapy Diagnoses:   1. Low back pain  Follow Up In Physical Therapy          General:  Reason for visit: LBP with right upper buttock pain  Referred by: Dr Evin Cowart MD appt:  6/20/24  Preferred Name:  Rika  Script:  PT evaluate and treat  Onset Date:  2/15/2024    Assessment:  Patient tolerated treatment well, did well with progression this date.    Patient needs continued work on/skilled PT for: neutral spine strengthening and pain reduced lumbar mobility to address remaining functional, objective and subjective deficits to allow them to return to prior /optimal level of function with ADLs.  Patient is progressing with goals: decreased pain  Skilled care:  Supervised TE    Plan:    Continue to progress per poc:   NV add standing DLS ex    Subjective:   Patient reports:  she is generally feeling better. She had a good weekend    Have you fallen since last visit:  no    Precautions: no fall risk    Pain:  3/10  Location/Type of pain: sore central low back pain    HEP compliance/understanding:  yes    Objective:   Objective Measurements:    Function:     Posture: Pt maintains neutral spine well with bird dog ex  Gait:  Balance:   ROM:  Prone extension to approx 50% but pain beyond that. Sustained props x 5 minutes in 2 incremental ranges causes increased central pain  Strength:  Flexibility:      Treatment:   **= HEP  NV= Next visit  np= not performed  nb= non-billable  G= group HEP= discharged to [facility]      Therapeutic Exercise:    10 minutes  Prone lying x 5 minutes,  Trial of 2 props with table  "adjusted for a total of 5 minutes but with gradual increased pain      Manual Therapy:       minutes  Gentle PA mobs cause subjective increased pain    Neuromuscular Re-education:    20 minutes  Bird Dog, 10x  Red PB bridge, 20x, good response  Red PB DKC, 20x  Red PB LTR, 10\" hold, 10x each  Iso hip flexion supine , 5\", 10x each leg    Modalities:       Vasopneumatic Device       minutes  Electrical Stimulation        15 minutes  IFC 10 CV to lydia LS region in prone with CP  Ultrasound            minutes  Iontophoresis                     minutes  Cold Pack            minutes  Mechanical Traction           minutes      "

## 2024-03-11 ENCOUNTER — TREATMENT (OUTPATIENT)
Dept: PHYSICAL THERAPY | Facility: CLINIC | Age: 39
End: 2024-03-11
Payer: COMMERCIAL

## 2024-03-11 DIAGNOSIS — M54.50 LOW BACK PAIN: ICD-10-CM

## 2024-03-11 PROCEDURE — 97110 THERAPEUTIC EXERCISES: CPT | Mod: GP

## 2024-03-11 PROCEDURE — 97014 ELECTRIC STIMULATION THERAPY: CPT | Mod: GP

## 2024-03-11 NOTE — PROGRESS NOTES
Physical Therapy  Physical Therapy Progress Note    Patient Name Rika Steve   MRN: [id number]  Today's Date: 03/11/24  Time Calculation  Start Time: 0935  Stop Time: 1015  Time Calculation (min): 40 min    Insurance:    Visit #: 6  Insurance Reviewed  (per information provided by  pre-cert team)   Authorization required:  N  Approved # of visits: 40  Authorization/MCR certification date range:    Therapy Diagnoses:   1. Low back pain  Follow Up In Physical Therapy        General:  Reason for visit: LBP with right upper buttock pain  Referred by: Dr Evin Cowart MD appt:  6/20/24  Preferred Name:  Rika  Script:  PT evaluate and treat  Onset Date:  2/15/2024    Assessment:  Patient tolerated treatment fair, completing ex , for neutral spine, but unable to increase lumbar extension due to pain. Patient has tenderness to light palpation of lower lumbar paraspinal region.  Patient needs continued work on/skilled PT for: working towards increased lumbar extension range and pain management to address remaining functional, objective and subjective deficits to allow them to return to prior /optimal level of function with ADLs.  Patient is progressing with goals: more centralized pain  Skilled care:  Supervised TE, attempted manual    Plan:    Continue to progress per poc:   NV add     Subjective:   Patient reports:  she was dancing this weekend and travelling so she is sore in the central low back    Have you fallen since last visit:  no    Precautions: no fall risk    Pain:  5/10  Location/Type of pain:  central dull pain    HEP compliance/understanding:  good    Objective:   Objective Measurements:    Function:     Posture: Checked PSIS levels in prone and unremarkable. (-) long sit test in supine  Gait:  Balance:   ROM:  Prone lumbar extension approx 50%  Strength:  Flexibility:    TTP: light/mod pressure of lower LS paraspinals.  Treatment:   **= HEP  NV= Next visit  np= not performed  nb= non-billable   "G= group HEP= discharged to [facility]    Therapeutic Exercise:     25 minutes      Manual Therapy:       minutes      Neuromuscular Re-education:      minutes  Bird Dog, 10x  Quadruped lumbar extension  Modified planks, 10\" x 10  Red PB bridge, 20x, good response  Red PB DKC, 20x  Red PB LTR, 10\" hold, 10x each  Iso hip flexion supine , 5\", 10x each leg NP    Modalities:       Electrical Stimulation        15 minutes  IFC 8.4 CV to lydia LS region in prone with CP    "

## 2024-03-14 ENCOUNTER — TREATMENT (OUTPATIENT)
Dept: PHYSICAL THERAPY | Facility: CLINIC | Age: 39
End: 2024-03-14
Payer: COMMERCIAL

## 2024-03-14 DIAGNOSIS — M54.50 LOW BACK PAIN: ICD-10-CM

## 2024-03-14 PROCEDURE — 97014 ELECTRIC STIMULATION THERAPY: CPT | Mod: GP

## 2024-03-14 PROCEDURE — 97112 NEUROMUSCULAR REEDUCATION: CPT | Mod: GP

## 2024-03-14 NOTE — PROGRESS NOTES
"   Physical Therapy  Physical Therapy Progress Note    Patient Name Rika Steve   MRN: [id number]  Today's Date: 03/14/24  Time Calculation  Start Time: 1045  Stop Time: 1130  Time Calculation (min): 45 min    Insurance:    Visit #: 7  Insurance Reviewed  (per information provided by  pre-cert team)   Authorization required:  N  Approved # of visits: 40  Authorization/MCR certification date range:    Therapy Diagnoses:   1. Low back pain  Follow Up In Physical Therapy        General:  Reason for visit: LBP with right upper buttock pain  Referred by: Dr Evin Cowart MD appt:  6/20/24  Preferred Name:  Rika  Script:  PT evaluate and treat  Onset Date:  2/15/2024    Assessment:  Patient tolerated treatment well, did well with progression this date to standing DLS ex  Patient needs continued work on/skilled PT for: further challenge of core ex and pain management to address remaining functional, objective and subjective deficits to allow them to return to prior /optimal level of function with ADLs.  Patient is progressing with goals: decreased pain  Skilled care:  Supervised TE    Plan:    Continue to progress per poc:   NV add     Subjective:   Patient reports:  Feeling better today, but Tuesday she had a painful day    Have you fallen since last visit:  no    Precautions: no fall risk    Pain:  0/10    HEP compliance/understanding:  good    Objective:   Objective Measurements:    Function:     Posture: Pt demonstrates good posture and form with standing DLS ex  Gait:  Balance:   ROM:    Strength:  Flexibility:      Treatment:   **= HEP  NV= Next visit  np= not performed  nb= non-billable  G= group HEP= discharged to [facility]    Neuromuscular Re-education:    30 minutes  Bird Dog, 10x  Quadruped lumbar extension  Modified planks, 10\" x 10  Red PB bridge, 20x, good response  Red PB DKC, 20x  Red PB LTR, 10\" hold, 10x each  Mid rows, black TB, 20x  German shoulder extension, blue TB, 20x  DLS diagonal chops, bl " "20x    Iso hip flexion supine , 5\", 10x each leg NP    Modalities:       Electrical Stimulation        15 minutes  IFC x 15 min to lydia LS region, 10 CV with CP    "

## 2024-03-18 ENCOUNTER — APPOINTMENT (OUTPATIENT)
Dept: PHYSICAL THERAPY | Facility: CLINIC | Age: 39
End: 2024-03-18
Payer: COMMERCIAL

## 2024-03-29 ENCOUNTER — TREATMENT (OUTPATIENT)
Dept: PHYSICAL THERAPY | Facility: CLINIC | Age: 39
End: 2024-03-29
Payer: COMMERCIAL

## 2024-03-29 DIAGNOSIS — M54.50 LOW BACK PAIN: ICD-10-CM

## 2024-03-29 PROCEDURE — 97014 ELECTRIC STIMULATION THERAPY: CPT | Mod: GP

## 2024-03-29 PROCEDURE — 97110 THERAPEUTIC EXERCISES: CPT | Mod: GP

## 2024-03-29 PROCEDURE — 97140 MANUAL THERAPY 1/> REGIONS: CPT | Mod: GP

## 2024-03-29 NOTE — PROGRESS NOTES
Physical Therapy  Physical Therapy Progress Note    Patient Name Rika Steve   MRN: [id number]  Today's Date: 03/29/24  Time Calculation  Start Time: 1005  Stop Time: 1050  Time Calculation (min): 45 min    Insurance:    Visit #: 8  Insurance Reviewed  (per information provided by  pre-cert team)   Authorization required:  N  Approved # of visits: 40  Authorization/MCR certification date range:    Therapy Diagnoses:   1. Low back pain  Follow Up In Physical Therapy        General:  Reason for visit: LBP with right upper buttock pain  Referred by: Dr Evin Cowart MD appt:  6/20/24  Preferred Name:  Rika  Script:  PT evaluate and treat  Onset Date:  2/15/2024    Assessment:  Patient tolerated treatment fair, after experiencing an exacerbation of pain this past Wednesday after flying and then picking up her daughter. Pt has tenderness to palpation of left LS region, and continued limited lumbar extension range of approx 50%.    Patient would benefit from continuing HEP that doesn't aggravate her symptoms while following up with her primary MD to see if diagnostic testing should be done.   Patient is progressing with goals: partially  Skilled care:  Supervised ex. , manual    Plan:    Place on hold while she follows up with her MD    Subjective:   Patient reports that her symptoms seemed to have resolved since her last visit on 3/14/2024 until this past Wednesday when travelling home from vacation where she did a great deal of sitting on airplane, but especially when she bent down to  her daughter. She states that she couldn't stand all the way up that day which has improved, but she now has pain of the left central LS region again making it difficulty to sleep and take care of her daughter    Have you fallen since last visit:  no    Precautions: no fall risk    Pain:  4/10  Location/Type of pain:  Left central LS    HEP compliance/understanding:  good    Objective:   Objective Measurements:   "  Function:   Limits her lifting and strenuous activities. Still unable to resume exercise  Posture: WFL  Gait: n/a  Balance: n/a  ROM:  Prone lumbar extension to approx 50% of normal range  Strength: nt  Flexibility:  nt  Tenderness to palpation and light pressure of left LS paraspinals, especially lower lumbar region.  Treatment:   **= HEP  NV= Next visit  np= not performed  nb= non-billable  G= group HEP= discharged to HEP    Manual Therapy:     10 minutes  STM to thoracolumbar region  Gentle PA mobs performed that were tolerated in thoracic region but then uncomfortable in lumbar region.  Modalities:       Electrical Stimulation        15 minutes  IFC to lydia LS region, 10 CV in prone with CP    Therapeutic Exercise:     20 minutes  TM x 10' for warm up (NB)  Red PB bridge, 20x, good response  Red PB DKC, 20x  Red PB LTR, 10\" hold, 10x each  Prone lumbar extension, x 5 due to pain  DKC, 10\" 10x    Education:  Patient and therapist in agreement for patient to follow up with MD to see if diagnostic testing is recommended. Educated patient to continue those exercises that don't increase her pain        "

## 2024-06-10 ENCOUNTER — HOSPITAL ENCOUNTER (OUTPATIENT)
Dept: RADIOLOGY | Facility: CLINIC | Age: 39
Discharge: HOME | End: 2024-06-10
Payer: COMMERCIAL

## 2024-06-10 DIAGNOSIS — J45.901 UNSPECIFIED ASTHMA WITH (ACUTE) EXACERBATION (HHS-HCC): ICD-10-CM

## 2024-06-10 PROCEDURE — 71046 X-RAY EXAM CHEST 2 VIEWS: CPT | Performed by: RADIOLOGY

## 2024-06-10 PROCEDURE — 71046 X-RAY EXAM CHEST 2 VIEWS: CPT

## 2024-06-13 ENCOUNTER — APPOINTMENT (OUTPATIENT)
Dept: UROLOGY | Facility: CLINIC | Age: 39
End: 2024-06-13
Payer: COMMERCIAL

## 2024-06-19 NOTE — PROGRESS NOTES
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 female, who presents for follow-up on hx of kidney stones and Sacroiliac joint pain. She did PFPT for the SIJ pain that helped. No renal colic symptoms               PHYSICAL EXAMINATION:  No LMP recorded.  There is no height or weight on file to calculate BMI.  Visit Vitals  OB Status Having periods   Smoking Status Never     General Appearance: well appearing  Neuro: Alert and oriented           Urine dip: No results found for this or any previous visit (from the past 6 hour(s)).  Results for orders placed or performed in visit on 06/20/24 (from the past 96 hour(s))   POCT UA Automated manually resulted   Result Value Ref Range    POC Color, Urine Yellow Straw, Yellow, Light-Yellow    POC Appearance, Urine Clear Clear    POC Glucose, Urine NEGATIVE NEGATIVE mg/dl    POC Bilirubin, Urine NEGATIVE NEGATIVE    POC Ketones, Urine NEGATIVE NEGATIVE mg/dl    POC Specific Gravity, Urine 1.010 1.005 - 1.035    POC Blood, Urine TRACE-Intact (A) NEGATIVE    POC PH, Urine 6.5 No Reference Range Established PH    POC Protein, Urine NEGATIVE NEGATIVE, 30 (1+) mg/dl    POC Urobilinogen, Urine 0.2 0.2, 1.0 EU/DL    Poc Nitrite, Urine NEGATIVE NEGATIVE    POC Leukocytes, Urine TRACE (A) NEGATIVE        IMPRESSION AND PLAN:  1.Left nephrolithiasis s/p cystoscopy, retrograde pyelogram, Left Ureteroscopy, Laser lithotripsy, 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 who presents for follow-up on kidney stones and  sacroiliac joint pain.  Her pain responded to physical therapy.  She has not had symptoms of stones.  She will have an ultrasound at the end of the year or beginning of next year and we will do a telehealth to review it.          All questions and concerns were answered and addressed.  The patient expressed understanding and agrees with the plan.       Niranjan Cleary MD  [phone]

## 2024-06-20 ENCOUNTER — APPOINTMENT (OUTPATIENT)
Dept: UROLOGY | Facility: CLINIC | Age: 39
End: 2024-06-20
Payer: COMMERCIAL

## 2024-06-20 VITALS
DIASTOLIC BLOOD PRESSURE: 69 MMHG | BODY MASS INDEX: 22.2 KG/M2 | WEIGHT: 130 LBS | SYSTOLIC BLOOD PRESSURE: 104 MMHG | TEMPERATURE: 97.1 F | HEART RATE: 84 BPM | HEIGHT: 64 IN

## 2024-06-20 DIAGNOSIS — M54.50 LOW BACK PAIN, UNSPECIFIED BACK PAIN LATERALITY, UNSPECIFIED CHRONICITY, UNSPECIFIED WHETHER SCIATICA PRESENT: ICD-10-CM

## 2024-06-20 DIAGNOSIS — N20.0 KIDNEY STONE: ICD-10-CM

## 2024-06-20 LAB
POC APPEARANCE, URINE: CLEAR
POC BILIRUBIN, URINE: NEGATIVE
POC BLOOD, URINE: ABNORMAL
POC COLOR, URINE: YELLOW
POC GLUCOSE, URINE: NEGATIVE MG/DL
POC KETONES, URINE: NEGATIVE MG/DL
POC LEUKOCYTES, URINE: ABNORMAL
POC NITRITE,URINE: NEGATIVE
POC PH, URINE: 6.5 PH
POC PROTEIN, URINE: NEGATIVE MG/DL
POC SPECIFIC GRAVITY, URINE: 1.01
POC UROBILINOGEN, URINE: 0.2 EU/DL

## 2024-06-20 PROCEDURE — 81003 URINALYSIS AUTO W/O SCOPE: CPT | Performed by: UROLOGY

## 2024-06-20 PROCEDURE — 99213 OFFICE O/P EST LOW 20 MIN: CPT | Performed by: UROLOGY

## 2024-06-20 RX ORDER — NORETHINDRONE ACETATE AND ETHINYL ESTRADIOL 1MG-20(21)
KIT ORAL
COMMUNITY
Start: 2024-03-28

## 2024-06-20 RX ORDER — ALBUTEROL SULFATE 90 UG/1
2 AEROSOL, METERED RESPIRATORY (INHALATION)
COMMUNITY
Start: 2022-12-19

## 2024-09-16 ENCOUNTER — APPOINTMENT (OUTPATIENT)
Dept: PULMONOLOGY | Facility: CLINIC | Age: 39
End: 2024-09-16
Payer: COMMERCIAL

## 2024-12-02 ENCOUNTER — HOSPITAL ENCOUNTER (OUTPATIENT)
Dept: RADIOLOGY | Facility: CLINIC | Age: 39
Discharge: HOME | End: 2024-12-02
Payer: COMMERCIAL

## 2024-12-02 DIAGNOSIS — N20.0 KIDNEY STONE: ICD-10-CM

## 2024-12-02 PROCEDURE — 76770 US EXAM ABDO BACK WALL COMP: CPT | Performed by: RADIOLOGY

## 2024-12-02 PROCEDURE — 76770 US EXAM ABDO BACK WALL COMP: CPT

## 2024-12-16 ENCOUNTER — APPOINTMENT (OUTPATIENT)
Dept: UROLOGY | Facility: CLINIC | Age: 39
End: 2024-12-16
Payer: COMMERCIAL

## 2024-12-16 DIAGNOSIS — N20.0 KIDNEY STONE: Primary | ICD-10-CM

## 2024-12-16 PROCEDURE — 99442 PR PHYS/QHP TELEPHONE EVALUATION 11-20 MIN: CPT | Performed by: UROLOGY

## 2024-12-16 NOTE — PROGRESS NOTES
CHIEF COMPLAINT:    A telephone visit (audio only) between the patient (at the originating site) and the provider (at the distant site) was utilized to provide this telehealth service.    Verbal consent was requested and obtained fromNAME@ on this date, [unfilled] , for a telehealth visit.     HISTORY OF PRESENT ILLNESS:      This is a  38 y.o. female who presents for follow-up for review of the ultrasound.  The ultrasound was done recently that showed a small calcification centrally in the left kidney.  This has been stable compared to the ultrasound from February.  Patient clinically has been asymptomatic.  She has had no hematuria or or flank pain.  She has had no renal colic.    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 who presents for follow-up on kidney stones and sacroiliac joint pain.  Her pain responded to physical therapy.  She has not had symptoms of stones.  She will have an ultrasound at the end of the year or beginning of next year and we will do a telehealth to review it.      IMPRESSION AND PLAN:       Rika Steve is a 38 y.o. who presents with history of nephrolithiasis.  Ultrasound showed evidence of a small calcification left kidney with no evidence of obstruction.  Patient is asymptomatic.  We will observe.  We will obtain a CT scan in a year.      All questions and concerns were answered and addressed.  The patient expressed understanding and agrees with the plan.       SIGNATURES  Niranjan Cleary MD  [phone]

## 2024-12-18 DIAGNOSIS — N20.0 KIDNEY STONE: ICD-10-CM

## 2025-01-08 NOTE — PROGRESS NOTES
HISTORY OF PRESENT ILLNESS:  This is a 39 y.o. female who presents for follow-up for kidney stones. She was last seen via telehealth on [12/16/24]. She underwent a CT abdomen and pelvis without IV contrast on [12/30/24].     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 Below is a partial transcription:    IMPRESSION:   1. No hydroureteronephrosis or obstructive ureteric stone. Previously seen 1 cm calculus at the left UVJ has passed in the interim.   2.  Punctate BILATERAL nonobstructive nephrolithiasis.   3.  Partially duplicated LEFT renal collecting system, with the ureters joining proximally.         Patient started getting back and pelvic pain two days after our telehealth visit   She noted it got worse around Timothy  She notes this pain is on the left and the stone had been passed on CT   She had a 1 cm stone on US that has since passed (confirmed on CT)    She reports she went to the ER for the same pain again on New Years  She refused another CT scan until she spoke to our office  She does have some tiny calcifications (<1mm)     PHYSICAL EXAMINATION:  No LMP recorded.  Body mass index is 24.2 kg/m².  Visit Vitals  BP 99/67   Pulse 91   Temp 36.6 °C (97.9 °F) (Temporal)   Wt 64 kg (141 lb)   BMI 24.20 kg/m²   OB Status Having periods   Smoking Status Never   BSA 1.7 m²     General Appearance: well appearing  Neuro: Alert and oriented       PVR (by Ultrasound): 119 mL   Urine dip:   Recent Results (from the past 6 hours)   POCT UA Automated manually resulted    Collection Time: 01/09/25  9:38 AM   Result Value Ref Range    POC Color, Urine Yellow Straw, Yellow, Light-Yellow    POC Appearance, Urine Clear Clear    POC Glucose, Urine NEGATIVE  NEGATIVE mg/dl    POC Bilirubin, Urine NEGATIVE NEGATIVE    POC Ketones, Urine NEGATIVE NEGATIVE mg/dl    POC Specific Gravity, Urine >=1.030 1.005 - 1.035    POC Blood, Urine MODERATE (2+) (A) NEGATIVE    POC PH, Urine 5.5 No Reference Range Established PH    POC Protein, Urine NEGATIVE NEGATIVE mg/dl    POC Urobilinogen, Urine 0.2 0.2, 1.0 EU/DL    Poc Nitrite, Urine NEGATIVE NEGATIVE    POC Leukocytes, Urine NEGATIVE NEGATIVE       IMPRESSION AND PLAN:  Rika Steve is a 39 y.o. who presents with nephrolithiasis.    Nephrolithiasis      We would like the patient to increase citrate and fluid intake to help prevent further stones. We would also like the patient to undergo a metabolic workup with Mariajose Webber CNP.     We would like her to undergo her CT scan in June as previously scheduled.     Follow up with Mariajose Webber CNP for metabolic workup.     Scribe Attestation  By signing my name below, Judy GOTTLIEB, Jose A   attest that this documentation has been prepared under the direction and in the presence of Niranjan Cleary MD.

## 2025-01-09 ENCOUNTER — APPOINTMENT (OUTPATIENT)
Dept: UROLOGY | Facility: CLINIC | Age: 40
End: 2025-01-09
Payer: COMMERCIAL

## 2025-01-09 VITALS
DIASTOLIC BLOOD PRESSURE: 67 MMHG | WEIGHT: 141 LBS | SYSTOLIC BLOOD PRESSURE: 99 MMHG | TEMPERATURE: 97.9 F | HEART RATE: 91 BPM | BODY MASS INDEX: 24.2 KG/M2

## 2025-01-09 DIAGNOSIS — N20.0 KIDNEY STONE: ICD-10-CM

## 2025-01-09 LAB
POC APPEARANCE, URINE: CLEAR
POC BILIRUBIN, URINE: NEGATIVE
POC BLOOD, URINE: ABNORMAL
POC COLOR, URINE: YELLOW
POC GLUCOSE, URINE: NEGATIVE MG/DL
POC KETONES, URINE: NEGATIVE MG/DL
POC LEUKOCYTES, URINE: NEGATIVE
POC NITRITE,URINE: NEGATIVE
POC PH, URINE: 5.5 PH
POC PROTEIN, URINE: NEGATIVE MG/DL
POC SPECIFIC GRAVITY, URINE: >=1.03
POC UROBILINOGEN, URINE: 0.2 EU/DL

## 2025-01-09 PROCEDURE — 99213 OFFICE O/P EST LOW 20 MIN: CPT | Performed by: UROLOGY

## 2025-01-09 PROCEDURE — 81003 URINALYSIS AUTO W/O SCOPE: CPT | Performed by: UROLOGY

## 2025-01-09 RX ORDER — NORETHINDRONE ACETATE AND ETHINYL ESTRADIOL 1.5-30(21)
KIT ORAL
COMMUNITY
Start: 2024-11-26

## 2025-01-17 DIAGNOSIS — J10.1 INFLUENZA A: ICD-10-CM

## 2025-01-17 DIAGNOSIS — J11.1 INFLUENZA IN ADULT: Primary | ICD-10-CM

## 2025-01-17 RX ORDER — OSELTAMIVIR PHOSPHATE 75 MG/1
75 CAPSULE ORAL 2 TIMES DAILY
Qty: 10 CAPSULE | Refills: 0 | Status: SHIPPED | OUTPATIENT
Start: 2025-01-17 | End: 2025-01-22

## 2025-02-06 ENCOUNTER — APPOINTMENT (OUTPATIENT)
Dept: UROLOGY | Facility: CLINIC | Age: 40
End: 2025-02-06
Payer: COMMERCIAL

## 2025-02-06 DIAGNOSIS — N20.0 KIDNEY STONE: Primary | ICD-10-CM

## 2025-02-06 PROCEDURE — 99214 OFFICE O/P EST MOD 30 MIN: CPT | Performed by: NURSE PRACTITIONER

## 2025-02-06 NOTE — PROGRESS NOTES
Subjective   Patient ID: Rika tSeve is a 39 y.o. female who presents for No chief complaint on file..  1. Left nephrolithiasis  a. 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 Hx of sacroiliac pain  3. Hematuria            Around new year she had a 1 cm UVJ stone that has since passed.  She is asymptomatic at present.  Subsequent CT did identify bilateral nonobstructing punctate stones.  She underwent metabolic eval and presents today to review.          Review of Systems   All other systems reviewed and are negative.      Objective   Physical Exam  Alert and oriented x3  Breathes easily on room air  No scleral icterus    Appears stated age, no acute distress      Assessment/Plan   Diagnoses and all orders for this visit:  Kidney stone    Patient completed 24-hour urine however she was recovering from the flu at that time.  I did identify low urine volume of 1.2 L.  She was also found to have a high urine pH however her urine calcium urine oxalate and urine citrate levels were all within normal range.  We discussed the importance of drinking at least 2 to 3 L of water daily and we did discuss decreasing sodium intake as well as her urine sodium level was elevated.  We will plan to repeat imaging in approximately 6 months and if she is found to have an increase in her stone burden or has any symptoms in the interim we will consider repeating 24-hour urine in the absence of illness.  Patient verbalized understanding.       CATHY Lala-CNP 02/06/25 10:47 AM

## 2025-06-18 ENCOUNTER — HOSPITAL ENCOUNTER (OUTPATIENT)
Dept: RADIOLOGY | Facility: CLINIC | Age: 40
Discharge: HOME | End: 2025-06-18
Payer: COMMERCIAL

## 2025-06-18 DIAGNOSIS — N20.0 KIDNEY STONE: ICD-10-CM

## 2025-06-18 PROCEDURE — 74176 CT ABD & PELVIS W/O CONTRAST: CPT

## 2025-07-07 ENCOUNTER — APPOINTMENT (OUTPATIENT)
Dept: UROLOGY | Facility: CLINIC | Age: 40
End: 2025-07-07
Payer: COMMERCIAL

## 2025-07-07 DIAGNOSIS — N20.0 KIDNEY STONE: Primary | ICD-10-CM

## 2025-07-07 PROCEDURE — 99213 OFFICE O/P EST LOW 20 MIN: CPT | Performed by: NURSE PRACTITIONER

## 2025-07-07 PROCEDURE — 1036F TOBACCO NON-USER: CPT | Performed by: NURSE PRACTITIONER

## 2025-07-07 NOTE — PROGRESS NOTES
This visit was completed via telemedicine. All issues as below were discussed and addressed but no physical exam was performed unless allowed by visual confirmation. Patient verbally consented to visit.    Subjective   Patient ID: Rika Steve is a 39 y.o. female who presents for No chief complaint on file..  1. 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 She is asymptomatic at present.   She underwent metabolic eval in Jan 2025 she was found to have a high urine pH however her urine calcium urine oxalate and urine citrate levels were all within normal range. Also had low urine output. Doing well with water intake. Recently completed CT             Review of Systems   All other systems reviewed and are negative.      Objective   Physical Exam  Alert and oriented x3  Breathes easily on room air  Appears stated age, no acute distress    === 06/18/25 ===    CT ABDOMEN PELVIS WO IV CONTRAST    - Impression -  No evidence of an acute intra-abdominal or pelvic process. No  evidence of nephrolithiasis or hydronephrosis.    MACRO:  None.    Signed by: Ezequiel Siddiqui 6/18/2025 10:13 AM  Dictation workstation:   [id number]      Assessment/Plan   Diagnoses and all orders for this visit:  Kidney stone  -     US renal complete; Future    No stones noted on recent CT. Given encouragement at this time. Plan to repeat metabolic eval at the end of August when she returns from 1.5 months in [facility]. If results are consistent with previous results we will plan for follow up in 1 year with kidney ultrasound at that time. Patient verbalized understanding.          CATHY Lala-CNP 07/07/25 4:01 PM

## 2025-07-08 ENCOUNTER — APPOINTMENT (OUTPATIENT)
Dept: UROLOGY | Facility: CLINIC | Age: 40
End: 2025-07-08
Payer: COMMERCIAL

## 2025-08-08 ENCOUNTER — APPOINTMENT (OUTPATIENT)
Dept: UROLOGY | Facility: CLINIC | Age: 40
End: 2025-08-08
Payer: COMMERCIAL

## 2026-07-23 ENCOUNTER — APPOINTMENT (OUTPATIENT)
Dept: UROLOGY | Facility: CLINIC | Age: 41
End: 2026-07-23
Payer: COMMERCIAL

## (undated) DEVICE — Device

## (undated) DEVICE — HOLMIUM 365 FORTEC FIBER

## (undated) DEVICE — ELECTRODE, HF, ESG PLASMA LOOP-MEDIUM 30 DEG

## (undated) DEVICE — GUIDEWIRE, SENSOR .038 3CM FLEX TIP, STRAIGHT 150CML

## (undated) DEVICE — SYRINGE, HYPODERMIC, CONTROL, LUER LOCK, 10 CC, PLASTIC, STERILE

## (undated) DEVICE — GLOVE, SURGICAL, PROTEXIS PI ORTHO, 7.0, PF, LF

## (undated) DEVICE — TOWEL PACK, STERILE, 4/PACK, BLUE

## (undated) DEVICE — GLOVE, SURGICAL, PROTEXIS PI ORTHO, 7.5, PF, LF

## (undated) DEVICE — SOLUTION, IRRIGATION, STERILE WATER, 1000 ML, POUR BOTTLE

## (undated) DEVICE — GLOVE, SURGICAL, PROTEXIS PI W/NEU-THERA, 7.5, PF, LF

## (undated) DEVICE — CATHETER, DUAL LUMEN, UDC/10/50 M

## (undated) DEVICE — GUIDEWIRE, DUAL SENSOR, .035 X 150 STRAIGHT,  3CM

## (undated) DEVICE — SOLUTION, IRRIGATION, SODIUM CHLORIDE 0.9%, 1000 ML, POUR BOTTLE